# Patient Record
Sex: MALE | Race: WHITE | NOT HISPANIC OR LATINO | URBAN - METROPOLITAN AREA
[De-identification: names, ages, dates, MRNs, and addresses within clinical notes are randomized per-mention and may not be internally consistent; named-entity substitution may affect disease eponyms.]

---

## 2021-09-07 PROBLEM — Z96.1: Noted: 2021-09-07

## 2021-09-07 PROBLEM — Z96.1: Noted: 2021-09-21

## 2021-10-05 ENCOUNTER — POST-OP CATARACT (OUTPATIENT)
Dept: URBAN - METROPOLITAN AREA CLINIC 9 | Facility: CLINIC | Age: 71
End: 2021-10-05

## 2021-10-05 DIAGNOSIS — Z96.1: ICD-10-CM

## 2021-10-05 PROCEDURE — 99024 POSTOP FOLLOW-UP VISIT: CPT

## 2021-10-05 RX ORDER — CARBOXYMETHYLCELLULOSE SODIUM AND GLYCERIN 5; 9 MG/ML; MG/ML: 1 SOLUTION/ DROPS OPHTHALMIC AS NEEDED

## 2021-10-05 RX ORDER — SODIUM CHLORIDE 50 MG/ML: 1 SOLUTION OPHTHALMIC EVERY EVENING

## 2021-10-05 RX ORDER — PREDNISOLONE ACETATE 10 MG/ML: 1 SUSPENSION/ DROPS OPHTHALMIC TWICE A DAY

## 2021-10-05 RX ORDER — BROMFENAC SODIUM 0.7 MG/ML: 1 SOLUTION/ DROPS OPHTHALMIC EVERY MORNING

## 2021-10-05 ASSESSMENT — TONOMETRY
OD_IOP_MMHG: 17
OS_IOP_MMHG: 18

## 2021-10-05 ASSESSMENT — KERATOMETRY
OS_K1POWER_DIOPTERS: 44.25
OS_K2POWER_DIOPTERS: 46.00
OS_AXISANGLE_DEGREES: 72
OD_K2POWER_DIOPTERS: 46.50
OD_AXISANGLE2_DEGREES: 49
OS_AXISANGLE2_DEGREES: 162
OD_K1POWER_DIOPTERS: 43.50
OD_AXISANGLE_DEGREES: 139

## 2021-10-05 ASSESSMENT — VISUAL ACUITY
OD_SC: 20/20
OS_SC: 20/20
OU_SC: 20/20-1

## 2023-05-04 LAB
PROSTATE SPECIFIC AG (NG/ML) IN SER/PLAS: 5 NG/ML (ref 0–4)
PROSTATE SPECIFIC AG FREE (NG/ML) IN SER/PLAS: 0.8 NG/ML
PROSTATE SPECIFIC AG FREE/PROSTATE SPECIFIC AG TOTAL IN SER/PLAS: 16 %

## 2023-05-25 DIAGNOSIS — G40.909 SEIZURE DISORDER (MULTI): ICD-10-CM

## 2023-05-25 PROBLEM — E55.9 VITAMIN D DEFICIENCY: Status: ACTIVE | Noted: 2023-05-25

## 2023-05-25 PROBLEM — K21.9 GERD (GASTROESOPHAGEAL REFLUX DISEASE): Status: ACTIVE | Noted: 2023-05-25

## 2023-05-25 PROBLEM — I48.91 ATRIAL FIBRILLATION (MULTI): Status: ACTIVE | Noted: 2023-05-25

## 2023-05-25 PROBLEM — M54.16 LUMBAR RADICULITIS: Status: ACTIVE | Noted: 2023-05-25

## 2023-05-25 PROBLEM — E78.00 ELEVATED LDL CHOLESTEROL LEVEL: Status: ACTIVE | Noted: 2023-05-25

## 2023-05-25 PROBLEM — M53.9 MULTILEVEL DEGENERATIVE DISC DISEASE: Status: ACTIVE | Noted: 2023-05-25

## 2023-05-25 PROBLEM — L98.9 SKIN LESION: Status: ACTIVE | Noted: 2023-05-25

## 2023-05-25 PROBLEM — R97.20 ELEVATED PSA: Status: ACTIVE | Noted: 2023-05-25

## 2023-05-25 PROBLEM — S01.21XA: Status: ACTIVE | Noted: 2023-05-25

## 2023-05-25 PROBLEM — R73.9 HYPERGLYCEMIA: Status: ACTIVE | Noted: 2023-05-25

## 2023-05-25 PROBLEM — R20.0 NUMBNESS IN RIGHT LEG: Status: ACTIVE | Noted: 2023-05-25

## 2023-05-25 PROBLEM — G25.0 ESSENTIAL TREMOR: Status: ACTIVE | Noted: 2023-05-25

## 2023-05-25 PROBLEM — R06.89 HEAVY BREATHING: Status: ACTIVE | Noted: 2023-05-25

## 2023-05-25 PROBLEM — M19.90 OSTEOARTHRITIS: Status: ACTIVE | Noted: 2023-05-25

## 2023-05-25 PROBLEM — R05.9 COUGH: Status: ACTIVE | Noted: 2023-05-25

## 2023-05-25 RX ORDER — PRIMIDONE 50 MG/1
50 TABLET ORAL
COMMUNITY
Start: 2022-01-19 | End: 2023-05-25 | Stop reason: SDUPTHER

## 2023-05-25 RX ORDER — CEFDINIR 300 MG/1
300 CAPSULE ORAL EVERY 12 HOURS
COMMUNITY
Start: 2023-03-09 | End: 2023-10-12 | Stop reason: ALTCHOICE

## 2023-05-25 RX ORDER — PRIMIDONE 50 MG/1
50 TABLET ORAL 2 TIMES DAILY
Qty: 180 TABLET | Refills: 0 | Status: SHIPPED | OUTPATIENT
Start: 2023-05-25 | End: 2023-09-26 | Stop reason: SDUPTHER

## 2023-05-25 RX ORDER — ATOVAQUONE AND PROGUANIL HYDROCHLORIDE 250; 100 MG/1; MG/1
TABLET, FILM COATED ORAL
COMMUNITY
Start: 2022-07-27 | End: 2023-10-12 | Stop reason: ALTCHOICE

## 2023-05-25 RX ORDER — OMEPRAZOLE 40 MG/1
40 CAPSULE, DELAYED RELEASE ORAL DAILY
COMMUNITY
Start: 2023-02-09 | End: 2023-11-15

## 2023-05-25 RX ORDER — NAPROXEN 500 MG/1
1 TABLET ORAL EVERY 12 HOURS PRN
COMMUNITY
Start: 2022-10-21 | End: 2023-10-12 | Stop reason: ALTCHOICE

## 2023-05-25 RX ORDER — LOPERAMIDE HYDROCHLORIDE 2 MG/1
CAPSULE ORAL
COMMUNITY
Start: 2022-07-27 | End: 2023-10-12 | Stop reason: ALTCHOICE

## 2023-09-10 PROBLEM — R49.0 DYSPHONIA: Status: ACTIVE | Noted: 2023-02-09

## 2023-09-10 PROBLEM — J37.0 CHRONIC LARYNGITIS: Status: ACTIVE | Noted: 2022-03-12

## 2023-09-10 PROBLEM — J30.2 SEASONAL ALLERGIC RHINITIS: Status: ACTIVE | Noted: 2022-03-12

## 2023-09-10 PROBLEM — J32.0 CHRONIC MAXILLARY SINUSITIS: Status: ACTIVE | Noted: 2022-03-12

## 2023-09-10 RX ORDER — PANTOPRAZOLE SODIUM 40 MG/1
TABLET, DELAYED RELEASE ORAL
COMMUNITY
End: 2023-10-12 | Stop reason: ALTCHOICE

## 2023-09-10 RX ORDER — MOXIFLOXACIN 5 MG/ML
SOLUTION/ DROPS OPHTHALMIC
COMMUNITY
End: 2023-10-12 | Stop reason: ALTCHOICE

## 2023-09-10 RX ORDER — SUCRALFATE 1 G/1
TABLET ORAL
COMMUNITY
End: 2024-05-02 | Stop reason: WASHOUT

## 2023-09-10 RX ORDER — HYDROCODONE BITARTRATE AND ACETAMINOPHEN 5; 325 MG/1; MG/1
1 TABLET ORAL EVERY 6 HOURS PRN
COMMUNITY
End: 2023-10-12 | Stop reason: ALTCHOICE

## 2023-09-10 RX ORDER — FLECAINIDE ACETATE 50 MG/1
TABLET ORAL
COMMUNITY
End: 2023-10-12 | Stop reason: ALTCHOICE

## 2023-09-10 RX ORDER — AZELASTINE 1 MG/ML
2 SPRAY, METERED NASAL 2 TIMES DAILY
COMMUNITY
End: 2024-05-02 | Stop reason: WASHOUT

## 2023-09-10 RX ORDER — CHLORHEXIDINE GLUCONATE ORAL RINSE 1.2 MG/ML
SOLUTION DENTAL
COMMUNITY
End: 2024-05-02 | Stop reason: WASHOUT

## 2023-09-10 RX ORDER — FLUTICASONE PROPIONATE 50 MCG
2 SPRAY, SUSPENSION (ML) NASAL DAILY
COMMUNITY
End: 2024-05-02 | Stop reason: WASHOUT

## 2023-09-10 RX ORDER — PREDNISOLONE ACETATE 10 MG/ML
SUSPENSION/ DROPS OPHTHALMIC
COMMUNITY
End: 2024-05-02 | Stop reason: WASHOUT

## 2023-09-26 DIAGNOSIS — G40.909 SEIZURE DISORDER (MULTI): ICD-10-CM

## 2023-09-26 RX ORDER — PRIMIDONE 50 MG/1
50 TABLET ORAL 2 TIMES DAILY
Qty: 60 TABLET | Refills: 0 | Status: SHIPPED | OUTPATIENT
Start: 2023-09-26 | End: 2023-10-12 | Stop reason: SDUPTHER

## 2023-10-12 ENCOUNTER — OFFICE VISIT (OUTPATIENT)
Dept: PRIMARY CARE | Facility: CLINIC | Age: 73
End: 2023-10-12
Payer: MEDICARE

## 2023-10-12 VITALS
HEIGHT: 70 IN | WEIGHT: 189.4 LBS | HEART RATE: 66 BPM | BODY MASS INDEX: 27.11 KG/M2 | SYSTOLIC BLOOD PRESSURE: 118 MMHG | DIASTOLIC BLOOD PRESSURE: 70 MMHG

## 2023-10-12 DIAGNOSIS — J06.9 ACUTE URI: ICD-10-CM

## 2023-10-12 DIAGNOSIS — I48.91 ATRIAL FIBRILLATION, UNSPECIFIED TYPE (MULTI): ICD-10-CM

## 2023-10-12 DIAGNOSIS — R73.9 HYPERGLYCEMIA: ICD-10-CM

## 2023-10-12 DIAGNOSIS — R97.20 ELEVATED PSA: ICD-10-CM

## 2023-10-12 DIAGNOSIS — J37.0 CHRONIC LARYNGITIS: ICD-10-CM

## 2023-10-12 DIAGNOSIS — E78.00 ELEVATED LDL CHOLESTEROL LEVEL: ICD-10-CM

## 2023-10-12 DIAGNOSIS — G25.0 ESSENTIAL TREMOR: ICD-10-CM

## 2023-10-12 DIAGNOSIS — R09.82 POST-NASAL DRIP: ICD-10-CM

## 2023-10-12 DIAGNOSIS — Z00.00 MEDICARE ANNUAL WELLNESS VISIT, SUBSEQUENT: Primary | ICD-10-CM

## 2023-10-12 DIAGNOSIS — E55.9 VITAMIN D DEFICIENCY: ICD-10-CM

## 2023-10-12 DIAGNOSIS — G40.909 SEIZURE DISORDER (MULTI): ICD-10-CM

## 2023-10-12 PROCEDURE — 1159F MED LIST DOCD IN RCRD: CPT | Performed by: STUDENT IN AN ORGANIZED HEALTH CARE EDUCATION/TRAINING PROGRAM

## 2023-10-12 PROCEDURE — G0439 PPPS, SUBSEQ VISIT: HCPCS | Performed by: STUDENT IN AN ORGANIZED HEALTH CARE EDUCATION/TRAINING PROGRAM

## 2023-10-12 PROCEDURE — 1170F FXNL STATUS ASSESSED: CPT | Performed by: STUDENT IN AN ORGANIZED HEALTH CARE EDUCATION/TRAINING PROGRAM

## 2023-10-12 PROCEDURE — 1125F AMNT PAIN NOTED PAIN PRSNT: CPT | Performed by: STUDENT IN AN ORGANIZED HEALTH CARE EDUCATION/TRAINING PROGRAM

## 2023-10-12 PROCEDURE — 99214 OFFICE O/P EST MOD 30 MIN: CPT | Performed by: STUDENT IN AN ORGANIZED HEALTH CARE EDUCATION/TRAINING PROGRAM

## 2023-10-12 RX ORDER — METHYLPREDNISOLONE 4 MG/1
TABLET ORAL
Qty: 21 TABLET | Refills: 0 | Status: SHIPPED | OUTPATIENT
Start: 2023-10-12 | End: 2024-05-02 | Stop reason: WASHOUT

## 2023-10-12 RX ORDER — CETIRIZINE HYDROCHLORIDE 10 MG/1
10 TABLET ORAL DAILY
Qty: 30 TABLET | Refills: 2 | Status: CANCELLED | OUTPATIENT
Start: 2023-10-12 | End: 2024-01-10

## 2023-10-12 RX ORDER — PRIMIDONE 50 MG/1
50 TABLET ORAL 2 TIMES DAILY
Qty: 60 TABLET | Refills: 0 | Status: SHIPPED | OUTPATIENT
Start: 2023-10-12 | End: 2023-10-18 | Stop reason: SDUPTHER

## 2023-10-12 RX ORDER — BENZONATATE 200 MG/1
200 CAPSULE ORAL 3 TIMES DAILY PRN
Qty: 42 CAPSULE | Refills: 0 | Status: SHIPPED | OUTPATIENT
Start: 2023-10-12 | End: 2023-11-11

## 2023-10-12 ASSESSMENT — ACTIVITIES OF DAILY LIVING (ADL)
DOING_HOUSEWORK: INDEPENDENT
DRESSING: INDEPENDENT
GROCERY_SHOPPING: INDEPENDENT
MANAGING_FINANCES: INDEPENDENT
TAKING_MEDICATION: INDEPENDENT
BATHING: INDEPENDENT

## 2023-10-12 ASSESSMENT — ENCOUNTER SYMPTOMS
LOSS OF SENSATION IN FEET: 0
DEPRESSION: 0
OCCASIONAL FEELINGS OF UNSTEADINESS: 0

## 2023-10-12 ASSESSMENT — PATIENT HEALTH QUESTIONNAIRE - PHQ9
SUM OF ALL RESPONSES TO PHQ9 QUESTIONS 1 AND 2: 0
1. LITTLE INTEREST OR PLEASURE IN DOING THINGS: NOT AT ALL
2. FEELING DOWN, DEPRESSED OR HOPELESS: NOT AT ALL

## 2023-10-12 NOTE — PROGRESS NOTES
Subjective   Reason for Visit: Tai Recinos is an 73 y.o. male here for a Medicare Wellness visit.          Reviewed all medications by prescribing practitioner or clinical pharmacist (such as prescriptions, OTCs, herbal therapies and supplements) and documented in the medical record.    HPI    Medicare wellness (subsequent)  Overall feeling well. Has had a cold for the last 3 days.    Immunizations: He has had his Pfizer COVID-19 vaccines with booster, Flu Vaccine, Shingrix, and possible Pneumonia  Due for influenza but will comeback next week when he gets his labwork since he's got a cold today.   Colonoscopy: done in 2023; 5 yr clearance. DUE 2027  Tobacco: Former Smoker, quit Cigarettes >30 years ago. Occasional Cigar.  Alcohol: Occasional    2. URI  Cough, runny nose, congestion x 3 days.   Took a covid test yesterday and it was negative.    3.  Essential Tremor  He takes primidone 50 mg daily for his tremors in his right arm.   The tremors are worse in this 1st and 3rd digit when flexed, or with fine motor movements such as writing.   The primidone has been helping significantly, and does not believe it needs to be increases at this time.   He did follow with Neurology in [place].  He is requesting refills of his primidone today     4. Hx of Afib.  Status post cardio ablation in 2013 for A. Flutter/A Fib.   He did follow with a cardiologist in [place].   He was recommended by his cardiologist to establish with a Cardiologist at [facility].  He denies chest pain, shortness of breath, headaches, or leg edema.  Willing to discuss the  provider as well     5. Post nasal drip/ chronic throat clearing  Currently seeing Dr. Murrell for chronic throat tickle.  Requesting referral for a different ENT.   Currently taking Flonase with Astelin and omeprazole which has not been helping.      6. Vit D def  Documented history of low vit D  Requesting labwork today.     7. Hyperlipidemia  No longer taking  "rivaroxaban 20 mg  Requesting labwork today    8. Elevated PSA  Elevated PSA of 5.0 on 5/1/2023 up from 4.5 on 10/17/2022    9. Former smoker (V15.82) (Z87.891)/ Cigar smoker (305.1) (F17.290)  If AAA screening not performed or found in your records when they arrive, we will provided a requisition at your next visit to have this performed.    10. Hyperglycemia   Requesting lab work today.    No Known Allergies    Immunization History   Administered Date(s) Administered    Hepatitis A vaccine, age 19 years and greater (HAVRIX) 07/27/2022, 02/10/2023    Influenza, High Dose Seasonal, Preservative Free 08/22/2022    Moderna SARS-CoV-2 Vaccination 07/23/2022    Pfizer Purple Cap SARS-CoV-2 01/29/2021, 02/19/2021    Pneumococcal polysaccharide vaccine, 23-valent, age 2 years and older (PNEUMOVAX 23) 12/17/2020    Tdap vaccine, age 7 year and older (BOOSTRIX) 06/08/2022    Typhoid, ViCPs 07/27/2022       Patient Self Assessment of Health Status  Patient Self Assessment: Excellent    Nutrition and Exercise  Current Diet: Well Balanced Diet  Adequate Fluid Intake: No  Caffeine: Yes  Exercise Frequency: Regularly    Functional Ability/Level of Safety  Cognitive Impairment Observed: No cognitive impairment observed    Home Safety Risk Factors: None    Patient Care Team:  Ana Lilia Austin DO as PCP - General     Review of Systems  All pertinent positive symptoms are included in the history of present illness.    All other systems have been reviewed and are negative and noncontributory to this patient's current ailments.    Objective   Vitals:  /70 (BP Location: Right arm, Patient Position: Sitting, BP Cuff Size: Adult)   Pulse 66   Ht 1.778 m (5' 10\")   Wt 85.9 kg (189 lb 6.4 oz)   BMI 27.18 kg/m²     Orders Only on 05/01/2023   Component Date Value Ref Range Status    PSA 05/01/2023 5.0 (H)  0.0 - 4.0 ng/mL Final    Comment: INTERPRETIVE INFORMATION: Prostate Specific Antigen  The Roche PSA electrochemiluminescent " immunoassay is used. Results   obtained with different test methods or kits cannot be used   interchangeably. The Roche PSA method is approved for use as an   aid in the detection of prostate cancer when used in conjunction   with a digital rectal exam in individuals with a prostate age 50   years and older. The Roche PSA is also indicated for the serial   measurement of PSA to aid in the prognosis and management of   prostate cancer patients. Elevated PSA concentrations can only   suggest the presence of prostate cancer until biopsy is performed.   PSA concentrations can also be elevated in benign prostatic   hyperplasia or inflammatory conditions of the prostate. PSA is   generally not elevated in healthy individuals or individuals with   nonprostatic carcinoma.      PSA, Free 05/01/2023 0.8  ng/mL Final    PSA, Free Pct 05/01/2023 16  % Final    Comment: INTERPRETIVE INFORMATION: Prostate Specific Antigen, Free                            Percentage  [facility] uses the Roche Free PSA electrochemiluminescent immunoassay   method in conjunction with the Roche PSA electrochemiluminescent   immunoassay method to determine the free PSA percentage. Values   obtained with different assay methods should not be used   interchangeably. The free PSA percentage is an aid in   distinguishing prostate cancer from benign prostatic conditions in   individuals with a prostate age 50 years and older with a total   PSA between 3 and 10 ng/mL and negative digital rectal examination   findings. Prostatic biopsy is required for the diagnosis of   cancer.   In patients with total PSA concentrations of 4-10 ng/mL, the   probability of finding prostate cancer on needle biopsy by age in   years is:  %fPSA               50-59    60-69    70 or older  0  - 10%            49%      58%      65%  11 - 18%            27%      34%      41%  19 - 25%            18%      24%                                 30%  Greater than 25%     9%      12%       16%  Other factors may help determine the actual risk of prostate   cancer in individual patients.  Performed By: [facility]  [address] [place] [address]  : Chandrakant Sykes MD, PhD         Physical Exam  CONSTITUTIONAL - well nourished, well developed, looks like stated age, in no acute distress, not ill-appearing, and not tired appearing  SKIN - normal skin color and pigmentation, normal skin turgor without rash, lesions, or nodules visualized  HEAD - no trauma, normocephalic  EYES - pupils are equal and reactive to light, extraocular muscles are intact, and normal external exam  ENT - TM's intact, no injection, no signs of infection, uvula midline, normal tongue movement and throat normal, no exudate, nasal passage without discharge and patent  NECK - supple without rigidity, no neck mass was observed, no thyromegaly or thyroid nodules  CHEST - clear to auscultation, no wheezing, no crackles and no rales, good effort  CARDIAC - regular rate and regular rhythm, no skipped beats, no murmur  ABDOMEN - no organomegaly, soft, nontender, nondistended, normal bowel sounds, no guarding/rebound/rigidity  EXTREMITIES - no edema, no deformities  NEUROLOGICAL - normal gait, normal balance, normal motor, no ataxia  PSYCHIATRIC - alert, pleasant and cordial, age-appropriate  IMMUNOLOGIC - no cervical lymphadenopathy    Assessment/Plan   Problem List Items Addressed This Visit       Atrial fibrillation (CMS/HCC)    Relevant Orders    TSH with reflex to Free T4 if abnormal    Lipid Panel    Comprehensive Metabolic Panel    CBC and Auto Differential    Hemoglobin A1C    Vitamin D 25-Hydroxy,Total (for eval of Vitamin D levels)    Elevated LDL cholesterol level    Relevant Orders    TSH with reflex to Free T4 if abnormal    Lipid Panel    Comprehensive Metabolic Panel    CBC and Auto Differential    Hemoglobin A1C    Vitamin D 25-Hydroxy,Total (for eval of Vitamin D levels)    Elevated  PSA    Relevant Orders    Prostate Specific Antigen    TSH with reflex to Free T4 if abnormal    Lipid Panel    Comprehensive Metabolic Panel    CBC and Auto Differential    Hemoglobin A1C    Vitamin D 25-Hydroxy,Total (for eval of Vitamin D levels)    Essential tremor    Relevant Medications    primidone (Mysoline) 50 mg tablet    Other Relevant Orders    TSH with reflex to Free T4 if abnormal    Lipid Panel    Comprehensive Metabolic Panel    CBC and Auto Differential    Hemoglobin A1C    Vitamin D 25-Hydroxy,Total (for eval of Vitamin D levels)    Hyperglycemia    Relevant Orders    TSH with reflex to Free T4 if abnormal    Lipid Panel    Comprehensive Metabolic Panel    CBC and Auto Differential    Hemoglobin A1C    Vitamin D 25-Hydroxy,Total (for eval of Vitamin D levels)    Vitamin D deficiency    Relevant Orders    TSH with reflex to Free T4 if abnormal    Lipid Panel    Comprehensive Metabolic Panel    CBC and Auto Differential    Hemoglobin A1C    Vitamin D 25-Hydroxy,Total (for eval of Vitamin D levels)    Chronic laryngitis    Relevant Orders    TSH with reflex to Free T4 if abnormal    Lipid Panel    Comprehensive Metabolic Panel    CBC and Auto Differential    Hemoglobin A1C    Vitamin D 25-Hydroxy,Total (for eval of Vitamin D levels)    Medicare annual wellness visit, subsequent - Primary    Relevant Orders    TSH with reflex to Free T4 if abnormal    Lipid Panel    Comprehensive Metabolic Panel    CBC and Auto Differential    Hemoglobin A1C    Vitamin D 25-Hydroxy,Total (for eval of Vitamin D levels)     Other Visit Diagnoses       Seizure disorder (CMS/HCC)        Relevant Orders    TSH with reflex to Free T4 if abnormal    Lipid Panel    Comprehensive Metabolic Panel    CBC and Auto Differential    Hemoglobin A1C    Vitamin D 25-Hydroxy,Total (for eval of Vitamin D levels)    Acute URI        Relevant Medications    methylPREDNISolone (Medrol Dospak) 4 mg tablets    benzonatate (Tessalon) 200 mg  capsule          1. Encounter for Medicare annual wellness exam (V70.0) (Z00.00)  Complete history and physical examination as well as an MCR were performed  We will notify of test results once available and make treatment recommendations accordingly     2. Acute URI  A medrol dose pack and tessalon pearls were sent to your pharmacy     3. Essential tremor (333.1) (G25.0)  We can provide refills for your Primidone. If you ever desire to have a referral to a neurologist, we will be happy to provide one. Refill provided without any changes    4. Atrial fibrillation (427.31) (I48.91)  Asymptomatic, Please call and schedule an appointment with your Cardiologist. If you need a referral we will be glad to provide one.  I will have my MA provide information for Dr. Gaviria    5. Post nasal drip/chronic throat clearing (786.09) (R06.89)  A rx for zyrtec was sent to your pharmacy to see if this helps your sxs.  Additionally, we will send a referral to a new ENT as per your request.     6. Vitamin D deficiency (268.9) (E55.9)  We will notify of test results once available and make treatment recommendations accordingly    7. Hyperlipidemia  We will notify of test results once available and make treatment recommendations accordingly    8. Prostate cancer screening (V76.44) (Z12.5)  We will notify of test results once available and make treatment recommendations accordingly     9. Former smoker (V15.82) (Z87.891)/ Cigar smoker (305.1) (F17.290)  If AAA screening not performed or found in your records when they arrive, we will provided a requisition to have this performed.    10. Hyperglycemia   We will notify of test results once available and make treatment recommendations accordingly    Thank you for letting us be a part of your care team.  Please call the office if you have further questions or concerns regarding your care  Please follow up in 1 year for your annual wellness exam.

## 2023-10-18 ENCOUNTER — LAB (OUTPATIENT)
Dept: LAB | Facility: LAB | Age: 73
End: 2023-10-18
Payer: MEDICARE

## 2023-10-18 DIAGNOSIS — G25.0 ESSENTIAL TREMOR: ICD-10-CM

## 2023-10-18 DIAGNOSIS — R73.9 HYPERGLYCEMIA: ICD-10-CM

## 2023-10-18 DIAGNOSIS — E78.00 ELEVATED LDL CHOLESTEROL LEVEL: ICD-10-CM

## 2023-10-18 DIAGNOSIS — E55.9 VITAMIN D DEFICIENCY: ICD-10-CM

## 2023-10-18 DIAGNOSIS — R97.20 ELEVATED PSA: ICD-10-CM

## 2023-10-18 DIAGNOSIS — J37.0 CHRONIC LARYNGITIS: ICD-10-CM

## 2023-10-18 DIAGNOSIS — G40.909 SEIZURE DISORDER (MULTI): ICD-10-CM

## 2023-10-18 DIAGNOSIS — Z00.00 MEDICARE ANNUAL WELLNESS VISIT, SUBSEQUENT: ICD-10-CM

## 2023-10-18 DIAGNOSIS — I48.91 ATRIAL FIBRILLATION, UNSPECIFIED TYPE (MULTI): ICD-10-CM

## 2023-10-18 LAB
25(OH)D3 SERPL-MCNC: 71 NG/ML (ref 30–100)
ALBUMIN SERPL BCP-MCNC: 4.4 G/DL (ref 3.4–5)
ALP SERPL-CCNC: 87 U/L (ref 33–136)
ALT SERPL W P-5'-P-CCNC: 14 U/L (ref 10–52)
ANION GAP SERPL CALC-SCNC: 10 MMOL/L (ref 10–20)
AST SERPL W P-5'-P-CCNC: 17 U/L (ref 9–39)
BASOPHILS # BLD AUTO: 0.05 X10*3/UL (ref 0–0.1)
BASOPHILS NFR BLD AUTO: 0.7 %
BILIRUB SERPL-MCNC: 1 MG/DL (ref 0–1.2)
BUN SERPL-MCNC: 26 MG/DL (ref 6–23)
CALCIUM SERPL-MCNC: 9.5 MG/DL (ref 8.6–10.3)
CHLORIDE SERPL-SCNC: 102 MMOL/L (ref 98–107)
CHOLEST SERPL-MCNC: 191 MG/DL (ref 0–199)
CHOLESTEROL/HDL RATIO: 3.8
CO2 SERPL-SCNC: 29 MMOL/L (ref 21–32)
CREAT SERPL-MCNC: 0.95 MG/DL (ref 0.5–1.3)
EOSINOPHIL # BLD AUTO: 0.26 X10*3/UL (ref 0–0.4)
EOSINOPHIL NFR BLD AUTO: 3.8 %
ERYTHROCYTE [DISTWIDTH] IN BLOOD BY AUTOMATED COUNT: 12.8 % (ref 11.5–14.5)
EST. AVERAGE GLUCOSE BLD GHB EST-MCNC: 114 MG/DL
GFR SERPL CREATININE-BSD FRML MDRD: 86 ML/MIN/1.73M*2
GLUCOSE SERPL-MCNC: 90 MG/DL (ref 74–99)
HBA1C MFR BLD: 5.6 %
HCT VFR BLD AUTO: 47.2 % (ref 41–52)
HDLC SERPL-MCNC: 50.2 MG/DL
HGB BLD-MCNC: 16.1 G/DL (ref 13.5–17.5)
IMM GRANULOCYTES # BLD AUTO: 0.02 X10*3/UL (ref 0–0.5)
IMM GRANULOCYTES NFR BLD AUTO: 0.3 % (ref 0–0.9)
LDLC SERPL CALC-MCNC: 128 MG/DL
LYMPHOCYTES # BLD AUTO: 2.21 X10*3/UL (ref 0.8–3)
LYMPHOCYTES NFR BLD AUTO: 32.1 %
MCH RBC QN AUTO: 32.7 PG (ref 26–34)
MCHC RBC AUTO-ENTMCNC: 34.1 G/DL (ref 32–36)
MCV RBC AUTO: 96 FL (ref 80–100)
MONOCYTES # BLD AUTO: 0.65 X10*3/UL (ref 0.05–0.8)
MONOCYTES NFR BLD AUTO: 9.4 %
NEUTROPHILS # BLD AUTO: 3.69 X10*3/UL (ref 1.6–5.5)
NEUTROPHILS NFR BLD AUTO: 53.7 %
NON HDL CHOLESTEROL: 141 MG/DL (ref 0–149)
NRBC BLD-RTO: 0 /100 WBCS (ref 0–0)
PLATELET # BLD AUTO: 195 X10*3/UL (ref 150–450)
PMV BLD AUTO: 9.5 FL (ref 7.5–11.5)
POTASSIUM SERPL-SCNC: 4.3 MMOL/L (ref 3.5–5.3)
PROT SERPL-MCNC: 7.1 G/DL (ref 6.4–8.2)
PSA SERPL-MCNC: 4.82 NG/ML
RBC # BLD AUTO: 4.93 X10*6/UL (ref 4.5–5.9)
SODIUM SERPL-SCNC: 137 MMOL/L (ref 136–145)
TRIGL SERPL-MCNC: 65 MG/DL (ref 0–149)
TSH SERPL-ACNC: 1.65 MIU/L (ref 0.44–3.98)
VLDL: 13 MG/DL (ref 0–40)
WBC # BLD AUTO: 6.9 X10*3/UL (ref 4.4–11.3)

## 2023-10-18 PROCEDURE — 82306 VITAMIN D 25 HYDROXY: CPT

## 2023-10-18 PROCEDURE — 83036 HEMOGLOBIN GLYCOSYLATED A1C: CPT

## 2023-10-18 PROCEDURE — 36415 COLL VENOUS BLD VENIPUNCTURE: CPT

## 2023-10-18 PROCEDURE — 85025 COMPLETE CBC W/AUTO DIFF WBC: CPT

## 2023-10-18 PROCEDURE — 80053 COMPREHEN METABOLIC PANEL: CPT

## 2023-10-18 PROCEDURE — 84443 ASSAY THYROID STIM HORMONE: CPT

## 2023-10-18 PROCEDURE — 80061 LIPID PANEL: CPT

## 2023-10-18 PROCEDURE — 84153 ASSAY OF PSA TOTAL: CPT

## 2023-10-18 RX ORDER — PRIMIDONE 50 MG/1
50 TABLET ORAL 2 TIMES DAILY
Qty: 180 TABLET | Refills: 1 | Status: SHIPPED | OUTPATIENT
Start: 2023-10-18 | End: 2024-05-06

## 2023-10-18 NOTE — RESULT ENCOUNTER NOTE
Cholesterol did increase slightly to 191, HDL 50, , triglycerides 65    Sugar, kidneys, liver, electrolytes are all within normal limits    Prostate continues to be slightly elevated at 4.82  Has the patient ever followed up with a urologist  Rancho is to do so at his earliest mutual convenience    Hemoglobin A1c well within normal limits alongside vitamin D and thyroid    Complete blood cell count shows no anemia

## 2023-11-30 ENCOUNTER — APPOINTMENT (OUTPATIENT)
Dept: OTOLARYNGOLOGY | Facility: CLINIC | Age: 73
End: 2023-11-30
Payer: MEDICARE

## 2023-12-06 ENCOUNTER — OFFICE VISIT (OUTPATIENT)
Dept: OTOLARYNGOLOGY | Facility: CLINIC | Age: 73
End: 2023-12-06
Payer: MEDICARE

## 2023-12-06 VITALS — WEIGHT: 180 LBS | BODY MASS INDEX: 25.83 KG/M2

## 2023-12-06 DIAGNOSIS — J34.2 DEVIATED SEPTUM: Primary | ICD-10-CM

## 2023-12-06 DIAGNOSIS — R05.3 CHRONIC COUGH: ICD-10-CM

## 2023-12-06 DIAGNOSIS — J32.9 CHRONIC SINUSITIS, UNSPECIFIED LOCATION: ICD-10-CM

## 2023-12-06 PROCEDURE — 99214 OFFICE O/P EST MOD 30 MIN: CPT | Performed by: GENERAL PRACTICE

## 2023-12-06 PROCEDURE — 31575 DIAGNOSTIC LARYNGOSCOPY: CPT | Performed by: GENERAL PRACTICE

## 2023-12-06 PROCEDURE — 1159F MED LIST DOCD IN RCRD: CPT | Performed by: GENERAL PRACTICE

## 2023-12-06 PROCEDURE — 1125F AMNT PAIN NOTED PAIN PRSNT: CPT | Performed by: GENERAL PRACTICE

## 2023-12-06 PROCEDURE — 4004F PT TOBACCO SCREEN RCVD TLK: CPT | Performed by: GENERAL PRACTICE

## 2023-12-06 RX ORDER — AMOXICILLIN AND CLAVULANATE POTASSIUM 875; 125 MG/1; MG/1
1 TABLET, FILM COATED ORAL 2 TIMES DAILY
Qty: 28 TABLET | Refills: 0 | Status: SHIPPED | OUTPATIENT
Start: 2023-12-06 | End: 2023-12-20

## 2023-12-06 RX ORDER — MUPIROCIN 20 MG/G
OINTMENT TOPICAL
Qty: 22 G | Refills: 0 | Status: SHIPPED | OUTPATIENT
Start: 2023-12-06 | End: 2024-05-02 | Stop reason: WASHOUT

## 2023-12-06 RX ORDER — MUPIROCIN 20 MG/G
OINTMENT TOPICAL
Qty: 22 G | Refills: 0 | Status: SHIPPED | OUTPATIENT
Start: 2023-12-06 | End: 2023-12-06

## 2023-12-06 ASSESSMENT — PATIENT HEALTH QUESTIONNAIRE - PHQ9
2. FEELING DOWN, DEPRESSED OR HOPELESS: NOT AT ALL
SUM OF ALL RESPONSES TO PHQ9 QUESTIONS 1 AND 2: 0
1. LITTLE INTEREST OR PLEASURE IN DOING THINGS: NOT AT ALL

## 2023-12-06 NOTE — PROGRESS NOTES
Otolaryngology - Head and Neck Surgery Outpatient New Patient Visit Note        Assessment/Plan   Problem List Items Addressed This Visit             ICD-10-CM       Pulmonary and Pneumonias    Cough R05.9     Other Visit Diagnoses         Codes    Deviated septum    -  Primary J34.2    Chronic sinusitis, unspecified location     J32.9    Relevant Medications    amoxicillin-pot clavulanate (Augmentin) 875-125 mg tablet    mupirocin (Bactroban) 2 % ointment    budesonide (Pulmicort) 0.5 mg/2 mL oral liquid    Other Relevant Orders    CT sinus wo IV contrast            73yoM with longstanding cough and throat clearning.   No improvement with prior reflux management.    Some rhinitis and discolored PND on NP scope.  Leftward NSD limits eval with nasal endscopy.  Will acquire treated CT sinus.  Will treat with augmentin as well as medicated irrigations (bactroban/budesonide) as there is some mucoid debris high in left nasal cavity.    RTC to discuss symptoms and imaging results.   Discussed consideration of SLP referral for habit cough but pt declines for now.           Follow up:  -plan for follow up in clinic as needed and after completion of ordered studies    All of the above findings, impressions, treatment planning and follow up plans were discussed with the patient who indicated understanding.  the patient was instructed to contact or return to clinic sooner if symptoms/signs persist or worsen despite the above management.      Shin Crowder MD  Otolaryngology - Head and Neck Surgery            History Of Present Illness  Tai Recinos is a 73 y.o. male presenting for chronic cough and throat tickle over years.   No inciting illness/trauma.    Reports prior use of astelin/flonase with limited effect.   Reports 6mo of use of reflux medications without effect.   Notes some congestion and PND.   Notes nonproductive cough  Notes a history of intermittent laryngospasm over >10y, but none in last 2-3y.      Denies  sore throat.   Deneis significant prior recurrent sinusitis  Denies nasal trauma/surgery.                Past Medical History  He has a past medical history of Personal history of other diseases of the nervous system and sense organs and Personal history of other specified conditions (02/23/2022).    Surgical History  He has a past surgical history that includes Other surgical history (01/19/2022); Other surgical history (11/07/2022); and Other surgical history (11/07/2022).     Social History  He reports that he has been smoking cigars. He has never used smokeless tobacco. No history on file for alcohol use and drug use.    Family History  Family History   Problem Relation Name Age of Onset    Hearing loss Other      Cancer Other          Allergies  Patient has no known allergies.    Review of Systems  ROS: Pertinent positives as noted in HPI.    - CONSTITUTIONAL: Does not report weight loss, fever or chills.    - HEENT:   Ear: Does not report tinnitus, vertigo, hearing loss, otalgia, otorrhea  Nose: Does not report  ,  , epistaxis, decreased smell  Throat: Does not report pain, dysphagia, odynophagia  Larynx: Does not report hoarseness,  difficulty breathing, pain with speaking (odynophonia)  Neck: Does not report new masses, pain, swelling  Face: Does not report sinus pain, pressure, swelling, numbness, weakness     - RESPIRATORY: Does not report SOB or  .    - CV: Does not report palpitations or chest pain.     - GI: Does not report abdominal pain, nausea, vomiting or diarrhea.    - : Does not report dysuria or urinary frequency.    - MSK: Does not report myalgia or joint pain.    - SKIN: Does not report rash or pruritus.    - NEUROLOGICAL: Does not report headache or syncope.    - PSYCHIATRIC: Does not report recent changes in mood. Does not report anxiety or depression.         Physical Exam:     GENERAL:   Alert & Oriented to person, place and time; Normal affect and appearance. Well developed and well  nourished. Conversant & cooperative with examination.     HEAD:   Normocephalic, atraumatic. No sinus tenderness to palpation. Normal parotid bilaterally. Normal facial strength.     NEUROLOGIC:   Cranial nerves II-XII grossly intact, gait WNL. Normal mood and affect.    EYES:   Extraocular movements intact. Pupils equal, round, reactive to light and accommodation. No nystagmus, no ptosis. no scleral injection.    EAR:   Normal auricle. No discomfort or TTP with manipulation.   Handheld otoscopic exam showed normal external auditory canals bilaterally. No purulence or EAC inflammation. Minimal cerumen.   Right tympanic membrane clear and mobile without evidence of perforation, retraction or middle ear effusion.   Left tympanic membrane clear and mobile without evidence of perforation, retraction or middle ear effusion.     NOSE:   No external deformity. No external nasal lesions, lacerations, or scars. Nasal tip symmetrical with normal nasal valves.   Nasal cavity with leftward deviated  septum, edematous  mucosa and turbinates. Pale mucoid debris in left OMU, but exam limited by deviated septum.  Pale mucoid debris eminating from posterior nasal cavity on the left.  No lesions, masses,   or polyps.     OC/OP:   Mucous membranes moist, no masses, lesions or exudates.   Normal tongue, floor of mouth, teeth, gums, lips. Normal posterior pharyngeal wall.    Normal tonsils without erythema, exudate or obvious calculi     NECK:   No neck masses or thyroid enlargement. Trachea midline. No tenderness to palpation    LYMPHATIC:   No cervical lymphadenopathy.     RESPIRATORY:   Symmetric chest elevation & no retractions. No significant hoarseness. No increased work of breathing.    CV:   No clubbing or cyanosis. No obvious edema    Skin:   No facial rashes, vesicles or lesions.     Extremities:   No gross abnormalities      Clinic Procedure    Nasal Endoscopy Laryngoscopy Nasophrayngosocopy   Procedure: flexible fiberoptic  laryngoscopy, nasopharyngoscopy.   Flexible Fiberoptic Laryngoscopy Indication:   inability to tolerate mirror exam     Risks, benefits, and alternatives, infection risk, bleeding risk and risk of mucosal trauma and pain were discussed with the patient. Verbal consent was obtained prior to the procedure and is detailed in the patient's record.     Procedure Note:      With the patient seated in the exam chair, the bilateral nasal cavity was topically treated with 4% lidocaine and phenylephrine.  The bilateral nasal cavity was intubated with the flexible laryngoscope.   Nasal Endoscopy: Nasal endoscopy was successfully completed using the endoscope and the nasal mucosa, septum, turbinates, and osteomeatal complex were examined.  Nasopharyngoscopy: Nasopharyngoscopy was successfully completed using the endoscope and the nasal mucosa, septum, turbinates, osteomeatal complex, and nasopharynx were examined.   Laryngoscopy: Laryngoscopy was successfully completed using the flexible laryngoscope and the nasopharynx, hypopharynx, and larynx were examined.  Patient Status: the patient tolerated the procedure well.   Complications: there were no complications.      . After obtaining adequate decongestion and anesthesia, the scope was introduced into the floor of the nasal cavity. The septum, inferior, and middle turbinates were without any mucosal lesions.  The Fossa of [place] were clear bilaterally, and good velopharyngeal closure was obtained on phonation.  Base of tongue, tonsillar complex, and posterior pharyngeal wall free of asymmetry or concerning ulcerations or masses.  supraglottic segments with mild edema,   and erythema at the esophageal opening and posterior supraglottis.    No mucosal ulcerations or masses.  True vocal cords abduct and adduct normally with no mucosal or mass lesions.  No masses visualized in the pyriform recesses.  The scope was removed and patient tolerated the procedure well.       Information review:  External sources (notes, imaging, lab results) listed below personally reviewed to aid in medical decision making process.  -  -  -

## 2023-12-07 DIAGNOSIS — R97.20 ELEVATED PSA: ICD-10-CM

## 2023-12-07 DIAGNOSIS — J32.9 CHRONIC SINUSITIS, UNSPECIFIED LOCATION: ICD-10-CM

## 2023-12-07 RX ORDER — BUDESONIDE 0.5 MG/2ML
INHALANT ORAL
Qty: 60 ML | Refills: 0 | Status: SHIPPED | OUTPATIENT
Start: 2023-12-07 | End: 2023-12-11

## 2023-12-08 DIAGNOSIS — J30.2 SEASONAL ALLERGIC RHINITIS, UNSPECIFIED TRIGGER: Primary | ICD-10-CM

## 2023-12-08 RX ORDER — MOMETASONE FUROATE 50 UG/1
2 SPRAY, METERED NASAL DAILY
Qty: 17 G | Refills: 11 | Status: SHIPPED | OUTPATIENT
Start: 2023-12-08 | End: 2024-05-02 | Stop reason: WASHOUT

## 2023-12-09 DIAGNOSIS — J32.9 CHRONIC SINUSITIS, UNSPECIFIED LOCATION: ICD-10-CM

## 2023-12-11 RX ORDER — BUDESONIDE 0.5 MG/2ML
INHALANT ORAL
Qty: 360 ML | Refills: 1 | Status: SHIPPED | OUTPATIENT
Start: 2023-12-11 | End: 2024-05-02 | Stop reason: WASHOUT

## 2023-12-27 ENCOUNTER — ANCILLARY PROCEDURE (OUTPATIENT)
Dept: RADIOLOGY | Facility: CLINIC | Age: 73
End: 2023-12-27
Payer: MEDICARE

## 2023-12-27 DIAGNOSIS — J32.9 CHRONIC SINUSITIS, UNSPECIFIED LOCATION: ICD-10-CM

## 2023-12-27 PROCEDURE — 70486 CT MAXILLOFACIAL W/O DYE: CPT | Performed by: RADIOLOGY

## 2023-12-27 PROCEDURE — 70486 CT MAXILLOFACIAL W/O DYE: CPT

## 2024-01-15 ENCOUNTER — APPOINTMENT (OUTPATIENT)
Dept: OTOLARYNGOLOGY | Facility: CLINIC | Age: 74
End: 2024-01-15
Payer: MEDICARE

## 2024-01-22 ENCOUNTER — OFFICE VISIT (OUTPATIENT)
Dept: OTOLARYNGOLOGY | Facility: CLINIC | Age: 74
End: 2024-01-22
Payer: MEDICARE

## 2024-01-22 VITALS — BODY MASS INDEX: 25.83 KG/M2 | WEIGHT: 180 LBS

## 2024-01-22 DIAGNOSIS — J32.2 CHRONIC ETHMOIDAL SINUSITIS: Primary | ICD-10-CM

## 2024-01-22 DIAGNOSIS — R05.3 CHRONIC COUGH: ICD-10-CM

## 2024-01-22 PROCEDURE — 99214 OFFICE O/P EST MOD 30 MIN: CPT | Performed by: GENERAL PRACTICE

## 2024-01-22 PROCEDURE — 1125F AMNT PAIN NOTED PAIN PRSNT: CPT | Performed by: GENERAL PRACTICE

## 2024-01-22 PROCEDURE — 1159F MED LIST DOCD IN RCRD: CPT | Performed by: GENERAL PRACTICE

## 2024-01-22 PROCEDURE — 4004F PT TOBACCO SCREEN RCVD TLK: CPT | Performed by: GENERAL PRACTICE

## 2024-01-22 RX ORDER — CLARITHROMYCIN 500 MG/1
1000 TABLET, FILM COATED, EXTENDED RELEASE ORAL DAILY
Qty: 28 TABLET | Refills: 0 | Status: SHIPPED | OUTPATIENT
Start: 2024-01-22 | End: 2024-02-05

## 2024-01-22 ASSESSMENT — PATIENT HEALTH QUESTIONNAIRE - PHQ9
SUM OF ALL RESPONSES TO PHQ9 QUESTIONS 1 AND 2: 0
2. FEELING DOWN, DEPRESSED OR HOPELESS: NOT AT ALL
1. LITTLE INTEREST OR PLEASURE IN DOING THINGS: NOT AT ALL

## 2024-01-22 NOTE — PROGRESS NOTES
Otolaryngology - Head and Neck Surgery Outpatient New Patient Visit Note        Assessment/Plan   Problem List Items Addressed This Visit             ICD-10-CM       ENT    Chronic maxillary sinusitis - Primary J32.0    Relevant Medications    clarithromycin XL (Biaxin XL) 500 mg 24 hr tablet       Pulmonary and Pneumonias    Cough R05.9       Some improvement while on antibiotics but ongoing chronic cough and throat clearing.  CT suggests chronic sinusitis.  Would like to try a different antibiotic, will treat with biaxin.        Follow up:  -plan for follow up in clinic as needed    All of the above findings, impressions, treatment planning and follow up plans were discussed with the patient who indicated understanding.  the patient was instructed to contact or return to clinic sooner if symptoms/signs persist or worsen despite the above management.      Shin Crowder MD  Otolaryngology - Head and Neck Surgery            History Of Present Illness  Tai Recinos is a 73 y.o. male presenting for follow up of treated CT sinus and chronic throat clearing.    Reports some incomplete improvement while on antibiotics, but symptoms have returned to baseline since stopping.  Continues on mometasone.    No reflux symptoms.     Recall    Tai Recinos is a 73 y.o. male presenting for chronic cough and throat tickle over years.   No inciting illness/trauma.     Reports prior use of astelin/flonase with limited effect.   Reports 6mo of use of reflux medications without effect.   Notes some congestion and PND.   Notes nonproductive cough  Notes a history of intermittent laryngospasm over >10y, but none in last 2-3y.       Denies sore throat.   Deneis significant prior recurrent sinusitis  Denies nasal trauma/surgery.            Past Medical History  He has a past medical history of Personal history of other diseases of the nervous system and sense organs and Personal history of other specified conditions  (02/23/2022).    Surgical History  He has a past surgical history that includes Other surgical history (01/19/2022); Other surgical history (11/07/2022); and Other surgical history (11/07/2022).     Social History  He reports that he has been smoking cigars. He has never used smokeless tobacco. No history on file for alcohol use and drug use.    Family History  Family History   Problem Relation Name Age of Onset    Hearing loss Other      Cancer Other          Allergies  Patient has no known allergies.    Review of Systems  ROS: Pertinent positives as noted in HPI.    - CONSTITUTIONAL: Does not report weight loss, fever or chills.    - HEENT:   Ear: Does not report tinnitus, vertigo, hearing loss, otalgia, otorrhea  Nose: Does not report congestion, rhinorrhea, epistaxis, decreased smell  Throat: Does not report pain, dysphagia, odynophagia  Larynx: Does not report hoarseness,  difficulty breathing, pain with speaking (odynophonia)  Neck: Does not report new masses, pain, swelling  Face: Does not report sinus pain, pressure, swelling, numbness, weakness     - RESPIRATORY: Does not report SOB or  .    - CV: Does not report palpitations or chest pain.     - GI: Does not report abdominal pain, nausea, vomiting or diarrhea.    - : Does not report dysuria or urinary frequency.    - MSK: Does not report myalgia or joint pain.    - SKIN: Does not report rash or pruritus.    - NEUROLOGICAL: Does not report headache or syncope.    - PSYCHIATRIC: Does not report recent changes in mood. Does not report anxiety or depression.         Physical Exam:     GENERAL:   Alert & Oriented to person, place and time; Normal affect and appearance. Well developed and well nourished. Conversant & cooperative with examination.     HEAD:   Normocephalic, atraumatic. No sinus tenderness to palpation. Normal parotid bilaterally. Normal facial strength.     NEUROLOGIC:   Cranial nerves II-XII grossly intact, gait WNL. Normal mood and  affect.    EYES:   Extraocular movements intact. Pupils equal, round, reactive to light and accommodation. No nystagmus, no ptosis. no scleral injection.    EAR:   Normal auricle. No discomfort or TTP with manipulation.   Handheld otoscopic exam showed normal external auditory canals bilaterally. No purulence or EAC inflammation. Minimal cerumen.   Right tympanic membrane clear and mobile without evidence of perforation, retraction or middle ear effusion.   Left tympanic membrane clear and mobile without evidence of perforation, retraction or middle ear effusion.     NOSE:   No external deformity. No external nasal lesions, lacerations, or scars. Nasal tip symmetrical with normal nasal valves.   Nasal cavity with leftward septum, normal mucosa and turbinates. No lesions, masses, purulence or polyps.     OC/OP:   Mucous membranes moist, no masses, lesions or exudates.   Normal tongue, floor of mouth, teeth, gums, lips. Normal posterior pharyngeal wall.    Normal tonsils without erythema, exudate or obvious calculi     NECK:   No neck masses or thyroid enlargement. Trachea midline. No tenderness to palpation    LYMPHATIC:   No cervical lymphadenopathy.     RESPIRATORY:   Symmetric chest elevation & no retractions. No significant hoarseness. No increased work of breathing.    CV:   No clubbing or cyanosis. No obvious edema    Skin:   No facial rashes, vesicles or lesions.     Extremities:   No gross abnormalities      Clinic Procedure        Information review:  External sources (notes, imaging, lab results) listed below personally reviewed to aid in medical decision making process.  - CT sinus shows frontal/ethmoid mucosal thickening, some bubbling without fluid level in L>R sphenoid, slight maxillary mucosal thickening and leftward NSD.    -  -

## 2024-02-06 ENCOUNTER — LAB (OUTPATIENT)
Dept: LAB | Facility: LAB | Age: 74
End: 2024-02-06
Payer: MEDICARE

## 2024-02-06 DIAGNOSIS — R97.20 ELEVATED PSA: ICD-10-CM

## 2024-02-06 LAB — PSA SERPL-MCNC: 4.42 NG/ML

## 2024-02-06 PROCEDURE — 84153 ASSAY OF PSA TOTAL: CPT

## 2024-02-06 PROCEDURE — 36415 COLL VENOUS BLD VENIPUNCTURE: CPT

## 2024-02-28 ENCOUNTER — OFFICE VISIT (OUTPATIENT)
Dept: UROLOGY | Facility: CLINIC | Age: 74
End: 2024-02-28
Payer: MEDICARE

## 2024-02-28 DIAGNOSIS — R97.20 ELEVATED PSA: ICD-10-CM

## 2024-02-28 DIAGNOSIS — N40.0 BENIGN PROSTATIC HYPERPLASIA WITHOUT LOWER URINARY TRACT SYMPTOMS: Primary | ICD-10-CM

## 2024-02-28 PROCEDURE — 4004F PT TOBACCO SCREEN RCVD TLK: CPT | Performed by: STUDENT IN AN ORGANIZED HEALTH CARE EDUCATION/TRAINING PROGRAM

## 2024-02-28 PROCEDURE — 99213 OFFICE O/P EST LOW 20 MIN: CPT | Performed by: STUDENT IN AN ORGANIZED HEALTH CARE EDUCATION/TRAINING PROGRAM

## 2024-02-28 PROCEDURE — 1125F AMNT PAIN NOTED PAIN PRSNT: CPT | Performed by: STUDENT IN AN ORGANIZED HEALTH CARE EDUCATION/TRAINING PROGRAM

## 2024-02-28 PROCEDURE — 1159F MED LIST DOCD IN RCRD: CPT | Performed by: STUDENT IN AN ORGANIZED HEALTH CARE EDUCATION/TRAINING PROGRAM

## 2024-02-28 NOTE — PROGRESS NOTES
Subjective   Patient ID: Tai Recinos is a 73 y.o. male.    HPI  74 yo male with history of TURP, and elevated PSA. No evidence of clinically significant prostate cancer, on MRI prostate from over a year ago. Now, PSA 4.4.     He is doing well overall. No complaints.     Lab Results   Component Value Date    PSA 4.42 (H) 02/06/2024    PSA 4.82 (H) 10/18/2023    PSA 5.0 (H) 05/01/2023    PSA 4.5 (H) 10/17/2022       Review of Systems    Objective   Physical Exam  Vitals reviewed.         Assessment/Plan   74 yo male with history of TURP, and elevated PSA. No evidence of clinically significant prostate cancer, on MRI prostate from over a year ago. Now, PSA 4.4.     He is doing well overall. No urinary complaints.     We will continue to monitor the trend every 6 months, he agrees with plan:    Plan:  PSA 6 months, August 2024.   FUV mid August 2024 after PSA obtained.   Diagnoses and all orders for this visit:  Elevated PSA  -     Prostate Specific Antigen; Future      Scribe Attestation  By signing my name below, ITala Scribmelody attest that this documentation has been prepared under the direction and in the presence of Ellen Oconnell MD.

## 2024-03-27 ENCOUNTER — OFFICE VISIT (OUTPATIENT)
Dept: ORTHOPEDIC SURGERY | Facility: HOSPITAL | Age: 74
End: 2024-03-27
Payer: MEDICARE

## 2024-03-27 ENCOUNTER — HOSPITAL ENCOUNTER (OUTPATIENT)
Dept: RADIOLOGY | Facility: HOSPITAL | Age: 74
Discharge: HOME | End: 2024-03-27
Payer: MEDICARE

## 2024-03-27 DIAGNOSIS — M25.561 RIGHT KNEE PAIN, UNSPECIFIED CHRONICITY: ICD-10-CM

## 2024-03-27 DIAGNOSIS — M17.11 OSTEOARTHRITIS OF RIGHT KNEE, UNSPECIFIED OSTEOARTHRITIS TYPE: ICD-10-CM

## 2024-03-27 PROCEDURE — 73564 X-RAY EXAM KNEE 4 OR MORE: CPT | Mod: RT

## 2024-03-27 PROCEDURE — 99213 OFFICE O/P EST LOW 20 MIN: CPT | Performed by: ORTHOPAEDIC SURGERY

## 2024-03-27 PROCEDURE — 73564 X-RAY EXAM KNEE 4 OR MORE: CPT | Mod: RIGHT SIDE | Performed by: RADIOLOGY

## 2024-03-27 PROCEDURE — 1125F AMNT PAIN NOTED PAIN PRSNT: CPT | Performed by: ORTHOPAEDIC SURGERY

## 2024-03-27 PROCEDURE — 4004F PT TOBACCO SCREEN RCVD TLK: CPT | Performed by: ORTHOPAEDIC SURGERY

## 2024-03-27 PROCEDURE — 1159F MED LIST DOCD IN RCRD: CPT | Performed by: ORTHOPAEDIC SURGERY

## 2024-03-27 PROCEDURE — 99203 OFFICE O/P NEW LOW 30 MIN: CPT | Performed by: ORTHOPAEDIC SURGERY

## 2024-03-27 ASSESSMENT — PAIN - FUNCTIONAL ASSESSMENT: PAIN_FUNCTIONAL_ASSESSMENT: 0-10

## 2024-03-27 ASSESSMENT — PAIN SCALES - GENERAL: PAINLEVEL_OUTOF10: 1

## 2024-03-27 ASSESSMENT — PAIN DESCRIPTION - DESCRIPTORS: DESCRIPTORS: DULL

## 2024-03-27 NOTE — PROGRESS NOTES
HPI  This is a pleasant 73 y.o. male here today for right knee pain. The pt is very active and a former marathon runner. He enjoys playing [employer] ball, and noticed atraumatic R medial knee about 1-2 months prior after sport. His pain eventually improved and has been able to enjoy using an elliptical without pain. He now notices that uneven ground and high impact activity make the pain worse. Outside of activity modification, he has not received any other treatment.       Past Medical History:   Diagnosis Date    Personal history of other diseases of the nervous system and sense organs     History of benign essential tremor    Personal history of other specified conditions 02/23/2022    History of elevated prostate specific antigen (PSA)       Past Surgical History:   Procedure Laterality Date    OTHER SURGICAL HISTORY  01/19/2022    Cardioversion    OTHER SURGICAL HISTORY  11/07/2022    Transurethral resection of prostate    OTHER SURGICAL HISTORY  11/07/2022    Prostate biopsy       Social History     Tobacco Use    Smoking status: Some Days     Types: Cigars    Smokeless tobacco: Never       ROS  Reviewed and all pertinent positives mentioned in HPI.    EXAM  Patient is in no acute distress.  They are alert and oriented x3.  They are of normal mood and affect.  They are in no acute distress.  The patient's limb is warm and well-perfused.  They have intact sensation to light touch in all lower extremity dermatomes.  There is a minimal effusion.    The patient's quadriceps and hamstring strength is 5 of 5.    The patient can do a straight leg raise with no radicular pain.  negative lachmans. negative posterior drawer.  There is 2+ patellofemoral crepitus.   The knee is stable to varus and valgus stress at both 0 and 30°.  There is tenderness along the medial joint line.  positive McMurrays      IMAGING  Imaging reviewed today reveal no gross fracture or dislocation.  Degenerative changes noted in the medial  compartment.  MRI reviewed reveals No MRI for review      ASSESSMENT  right osteoarthritis    PLAN      The diagnosis, natural history, and treatment options were discussed at length. We decided to pursue conservative treatment options at this time including OTC antiinflammatories, and activity modification. We discussed that the patients next step options include prescriptions strength anti-inflammatories, CSI, and possibly visco supplementation injections and the definitve treatment would be a total knee replacement but the pt does not feel he is at that point. He mentioned that he is planning on making a trip to [facility] this September and mentioned he may be interested in an CSI before his trip should his symptoms fail to improve. All questions were answered and the pt agreeable with the plan.

## 2024-05-02 ENCOUNTER — EVALUATION (OUTPATIENT)
Dept: SPEECH THERAPY | Facility: HOSPITAL | Age: 74
End: 2024-05-02
Payer: MEDICARE

## 2024-05-02 ENCOUNTER — OFFICE VISIT (OUTPATIENT)
Dept: OTOLARYNGOLOGY | Facility: HOSPITAL | Age: 74
End: 2024-05-02
Payer: MEDICARE

## 2024-05-02 VITALS — WEIGHT: 183.5 LBS | HEIGHT: 70 IN | BODY MASS INDEX: 26.27 KG/M2 | TEMPERATURE: 97.3 F

## 2024-05-02 DIAGNOSIS — R09.89 CHRONIC THROAT CLEARING: ICD-10-CM

## 2024-05-02 DIAGNOSIS — J38.3 VOCAL CORD DYSFUNCTION: ICD-10-CM

## 2024-05-02 DIAGNOSIS — R09.A2 GLOBUS PHARYNGEUS: ICD-10-CM

## 2024-05-02 DIAGNOSIS — J38.3 VOCAL CORD DYSFUNCTION: Primary | ICD-10-CM

## 2024-05-02 DIAGNOSIS — J38.5 LARYNGOSPASM: ICD-10-CM

## 2024-05-02 DIAGNOSIS — J38.5 LARYNGOSPASM: Primary | ICD-10-CM

## 2024-05-02 PROCEDURE — 31575 DIAGNOSTIC LARYNGOSCOPY: CPT | Performed by: OTOLARYNGOLOGY

## 2024-05-02 PROCEDURE — 1160F RVW MEDS BY RX/DR IN RCRD: CPT | Performed by: OTOLARYNGOLOGY

## 2024-05-02 PROCEDURE — 99214 OFFICE O/P EST MOD 30 MIN: CPT | Mod: 25 | Performed by: OTOLARYNGOLOGY

## 2024-05-02 PROCEDURE — 99204 OFFICE O/P NEW MOD 45 MIN: CPT | Performed by: OTOLARYNGOLOGY

## 2024-05-02 PROCEDURE — 4004F PT TOBACCO SCREEN RCVD TLK: CPT | Performed by: OTOLARYNGOLOGY

## 2024-05-02 PROCEDURE — 1159F MED LIST DOCD IN RCRD: CPT | Performed by: OTOLARYNGOLOGY

## 2024-05-02 PROCEDURE — 92524 BEHAVRAL QUALIT ANALYS VOICE: CPT | Mod: GN

## 2024-05-02 RX ORDER — AZELASTINE 1 MG/ML
1 SPRAY, METERED NASAL 2 TIMES DAILY
COMMUNITY

## 2024-05-02 ASSESSMENT — PATIENT HEALTH QUESTIONNAIRE - PHQ9
SUM OF ALL RESPONSES TO PHQ9 QUESTIONS 1 & 2: 0
2. FEELING DOWN, DEPRESSED OR HOPELESS: NOT AT ALL
1. LITTLE INTEREST OR PLEASURE IN DOING THINGS: NOT AT ALL

## 2024-05-02 ASSESSMENT — PAIN - FUNCTIONAL ASSESSMENT: PAIN_FUNCTIONAL_ASSESSMENT: 0-10

## 2024-05-02 ASSESSMENT — PAIN SCALES - GENERAL: PAINLEVEL_OUTOF10: 0 - NO PAIN

## 2024-05-02 NOTE — LETTER
May 2, 2024     Ana Lilia Austin DO  [address]  [facility], [address]  [facility] [place] [address]    Patient: Tai Recinos   YOB: 1950   Date of Visit: 5/2/2024       Dear Dr. Ana Lilia Austin DO:    Thank you for referring Tai Recinos to me for evaluation. Below are my notes for this consultation.  If you have questions, please do not hesitate to call me. I look forward to following your patient along with you.       Sincerely,     Gladys Michel MD      CC: Shin Crowder MD  ______________________________________________________________________________________    Reason For Consult  Chief Complaint   Patient presents with   • Throat Clearing        HISTORY OF PRESENT ILLNESS:  This is a request for consultation from Dr Crowder for Tai Recinos, who is a 73 y.o. male presenting for an initial visit for frequent throat clearing.  The patient states this has been occurring for the past 3-4 years with unknown onset. His wife believes it may be a habit but he reports feeling something in a throat. His throat clearing is constant, all day long, it does not wake him up while sleeping.     The patient exercises frequently, he has not concerns with breathing. He believes exercise may improve his throat-clearing as he does not notice throat clearing during exercise.    The patient has had a cervical fusion C3, C4, C5, in 2008 with no noticeable changes to his throat.    The patient feels he uses speech an average amount, the patient reports no hearing concerns though he has not been tested for hearing loss.    The patient reports no heartburn or indigestion.     The patient states he has laryngospasms. The first laryngospasm was in 1989 when laughing while eating. He states he has had 10-15 throughout his life, about twice in the last three years. He states he can feel when a laryngospasm is about to occur like a cramp in his throat.       Swallow:  Is not impacted.  "  Breathing:  No issues.      Other factors:  throat clearing.  Triggered by:  eating/talking/walking/excercising. Throat clearing is more frequent during the day.     Past Medical History  He has a past medical history of Personal history of other diseases of the nervous system and sense organs and Personal history of other specified conditions (02/23/2022).  Surgical History  He has a past surgical history that includes Other surgical history (01/19/2022); Other surgical history (11/07/2022); and Other surgical history (11/07/2022).     Social History  He reports that he has been smoking cigars. He has never used smokeless tobacco. No history on file for alcohol use and drug use.    Allergies  Patient has no known allergies.    Review of Systems  All 10 systems were reviewed and negative except for above.      Physical Exam  CONSTITUTIONAL: Well developed, well nourished.    VOICE: increased loudness, normal  RESPIRATION: Inspiratory upper respiratory noise.  NEURO: Alert and oriented x3, cranial nerves II-XII intact and symmetric bilaterally.    EARS: Normal external ears, external auditory canals, normal hearing to conversational voice.    NOSE: External nose midline, anterior rhinoscopy is normal with limited visualization to the anterior aspect of the interior turbinates. No lesions noted.     ORAL CAVITY/OROPHARYNX/LIPS: Normal mucous membranes, normal floor of mouth/tongue/OP, no masses or lesions are noted.    SKIN: Left side neck scar from previous cervical fusion.  PSYCH: Alert and oriented with appropriate mood and affect.        Last Recorded Vitals  Temperature 36.3 °C (97.3 °F), height 1.778 m (5' 10\"), weight 83.2 kg (183 lb 8 oz).    Procedure  PROCEDURE NOTE:  Recommended flexible laryngoscopy/stroboscopy.  Risks, benefits,  and alternatives were explained.  They wish to proceed and provide verbal consent.     PROCEDURE:  Flexible Laryngoscopy, CPT 16912     POSTPROCEDURE DIAGNOSIS:    INDICATIONS: " Inability to tolerate mirror exam or abnormal findings on mirror, Flexible Laryngoscopy performed to assess one of the followin. Diagnosis of symptomatic disorder involving the voice, swallow, upper aerodigestive tract, including HENRY disorders, or  2. Preoperative evaluation of vocal cord function for individuals undergoing surgery where the RLN or vagus nerves are at risk of injury, or  3. Further evaluation of abnormalities of the upper aerodigestive tract discovered by another modality, such as CT, MRI, bronchoscopy or EGD    Description of Procedure:    After adequate afrin and lidocaine spray, I advanced the endoscope.  Visualization of the nasopharynx, vallecula, posterior pharyngeal walls, pyriform, epiglottis and post cricoid areas was unremarkable.  The following laryngeal findings were noted:    vocal cord movement was normal  closure was normal  Mucosal wave was not assessed  Compression was increased AP> FVC   Vocal cord edema in the posterior aspect  No interarytenoid thickening  Some vocal cord splinting during deep breathing  No lesions or masses  the subglottis was widely patent  Pharyngeal wall squeeze was normal    Procedure well tolerated.     A/P  This is an initial visit for frequent throat clearing with clinical findings notable for edema and some compensatory MTD.   Secondary issues identified and managed on this patient visit include: laryngospasms and mild vocal cord splinting.     Discussed interrelationship with laryngeal irritability, and habitual clearing and laryngospasm, perhaps even splinting.  ? Some mild hearing loss for vs habitual projection.    We discussed the treatment options to include, medical and surgical options.  We have decided to proceed as follows:   Discussed impact of speaking loudly and potential habitual component on cycle of throat clearing, vocal cord swelling, and throat clearing in response to swelling.  Set up an appointment with Anya for speech therapy  You need speech therapy as part of your treatment. We will help you to schedule your speech appointment after your visit or you can call Speech Therapy Scheduling at [phone]. Please follow up pending the outcome of speech therapy.    Scribe Attestation  By signing my name below, Keegan BRANDON Scribe   attest that this documentation has been prepared under the direction and in the presence of Gladys Michel MD.

## 2024-05-02 NOTE — PROGRESS NOTES
Reason For Consult  Chief Complaint   Patient presents with    Throat Clearing        HISTORY OF PRESENT ILLNESS:  This is a request for consultation from Dr Crowder for Tai Recinos, who is a 73 y.o. male presenting for an initial visit for frequent throat clearing.  The patient states this has been occurring for the past 3-4 years with unknown onset. His wife believes it may be a habit but he reports feeling something in a throat. His throat clearing is constant, all day long, it does not wake him up while sleeping.     The patient exercises frequently, he has not concerns with breathing. He believes exercise may improve his throat-clearing as he does not notice throat clearing during exercise.    The patient has had a cervical fusion C3, C4, C5, in 2008 with no noticeable changes to his throat.    The patient feels he uses speech an average amount, the patient reports no hearing concerns though he has not been tested for hearing loss.    The patient reports no heartburn or indigestion.     The patient states he has laryngospasms. The first laryngospasm was in 1989 when laughing while eating. He states he has had 10-15 throughout his life, about twice in the last three years. He states he can feel when a laryngospasm is about to occur like a cramp in his throat.       Swallow:  Is not impacted.   Breathing:  No issues.      Other factors:  throat clearing.  Triggered by:  eating/talking/walking/excercising. Throat clearing is more frequent during the day.     Past Medical History  He has a past medical history of Personal history of other diseases of the nervous system and sense organs and Personal history of other specified conditions (02/23/2022).  Surgical History  He has a past surgical history that includes Other surgical history (01/19/2022); Other surgical history (11/07/2022); and Other surgical history (11/07/2022).     Social History  He reports that he has been smoking cigars. He has never used  "smokeless tobacco. No history on file for alcohol use and drug use.    Allergies  Patient has no known allergies.    Review of Systems  All 10 systems were reviewed and negative except for above.      Physical Exam  CONSTITUTIONAL: Well developed, well nourished.    VOICE: increased loudness, normal  RESPIRATION: Inspiratory upper respiratory noise.  NEURO: Alert and oriented x3, cranial nerves II-XII intact and symmetric bilaterally.    EARS: Normal external ears, external auditory canals, normal hearing to conversational voice.    NOSE: External nose midline, anterior rhinoscopy is normal with limited visualization to the anterior aspect of the interior turbinates. No lesions noted.     ORAL CAVITY/OROPHARYNX/LIPS: Normal mucous membranes, normal floor of mouth/tongue/OP, no masses or lesions are noted.    SKIN: Left side neck scar from previous cervical fusion.  PSYCH: Alert and oriented with appropriate mood and affect.        Last Recorded Vitals  Temperature 36.3 °C (97.3 °F), height 1.778 m (5' 10\"), weight 83.2 kg (183 lb 8 oz).    Procedure  PROCEDURE NOTE:  Recommended flexible laryngoscopy/stroboscopy.  Risks, benefits,  and alternatives were explained.  They wish to proceed and provide verbal consent.     PROCEDURE:  Flexible Laryngoscopy, CPT 22670     POSTPROCEDURE DIAGNOSIS:    INDICATIONS: Inability to tolerate mirror exam or abnormal findings on mirror, Flexible Laryngoscopy performed to assess one of the followin. Diagnosis of symptomatic disorder involving the voice, swallow, upper aerodigestive tract, including HENRY disorders, or  2. Preoperative evaluation of vocal cord function for individuals undergoing surgery where the RLN or vagus nerves are at risk of injury, or  3. Further evaluation of abnormalities of the upper aerodigestive tract discovered by another modality, such as CT, MRI, bronchoscopy or EGD    Description of Procedure:    After adequate afrin and lidocaine spray, I advanced " the endoscope.  Visualization of the nasopharynx, vallecula, posterior pharyngeal walls, pyriform, epiglottis and post cricoid areas was unremarkable.  The following laryngeal findings were noted:    vocal cord movement was normal  closure was normal  Mucosal wave was not assessed  Compression was increased AP> FVC   Vocal cord edema in the posterior aspect  No interarytenoid thickening  Some vocal cord splinting during deep breathing  No lesions or masses  the subglottis was widely patent  Pharyngeal wall squeeze was normal    Procedure well tolerated.     A/P  This is an initial visit for frequent throat clearing with clinical findings notable for edema and some compensatory MTD.   Secondary issues identified and managed on this patient visit include: laryngospasms and mild vocal cord splinting.     Discussed interrelationship with laryngeal irritability, and habitual clearing and laryngospasm, perhaps even splinting.  ? Some mild hearing loss for vs habitual projection.    We discussed the treatment options to include, medical and surgical options.  We have decided to proceed as follows:   Discussed impact of speaking loudly and potential habitual component on cycle of throat clearing, vocal cord swelling, and throat clearing in response to swelling.  Set up an appointment with Anya for speech therapy You need speech therapy as part of your treatment. We will help you to schedule your speech appointment after your visit or you can call Speech Therapy Scheduling at [phone]. Please follow up pending the outcome of speech therapy.    Scribe Attestation  By signing my name below, IKeegan, Elizabeth   attest that this documentation has been prepared under the direction and in the presence of Gladys Michel MD.

## 2024-05-03 PROBLEM — R09.89 CHRONIC THROAT CLEARING: Status: ACTIVE | Noted: 2024-05-03

## 2024-05-03 PROBLEM — J38.3 VOCAL CORD DYSFUNCTION: Status: ACTIVE | Noted: 2024-05-03

## 2024-05-03 PROBLEM — J38.5 LARYNGOSPASM: Status: ACTIVE | Noted: 2024-05-03

## 2024-05-03 NOTE — PROGRESS NOTES
Speech-Language Pathology    Voice Evaluation    Patient Name: Tai Recinos  MRN: [id number]  Today's Date: 5/2/2024     Time Calculation  Start Time: 1540  Stop Time: 1610  Time Calculation (min): 30 min      Current Problem:  Patient Active Problem List   Diagnosis    Atrial fibrillation (Multi)    Cough    Elevated LDL cholesterol level    Elevated PSA    Essential tremor    GERD (gastroesophageal reflux disease)    Heavy breathing    Hyperglycemia    Lumbar radiculitis    Multilevel degenerative disc disease    Numbness in right leg    Osteoarthritis    Simple laceration of nose    Skin lesion    Vitamin D deficiency    Dysphonia    Chronic maxillary sinusitis    Seasonal allergic rhinitis    Chronic laryngitis    Medicare annual wellness visit, subsequent    Vocal cord dysfunction    Laryngospasm    Chronic throat clearing     SUBJECTIVE  Patient alert and oriented and ready to participate in office visit this date.    IMPRESSIONS  Patient presents with vocal cord dysfunction, laryngospasms and ILS 2/2 a diagnosis of chronic throat clearing. Patient appears to be an excellent candidate for therapy which will target vocal wellness, cough suppression therapy and respiratory retraining.    Voice quality based on the GRBAS scale: 0=absent; 1=mild; 2=moderate; 3=severe    stGstrstastdstest:st st1st Roughness: 0    Breathiness: 0    Asthenia: 0    Strain: 0    CONTRIBUTING FACTORS  VCD/ILS  Habitual behaviors that misuse/abuse the voice  Inadequate intake of fluids for hydration    HISTORY/REASON FOR REFERRAL  Tai Recinos is a 73 y.o. male referred [facility] Dr. [facility] by Dr. Crowder for laryngospasms and frequent throat clearing. The patient states this has been occurring for the past 3-4 years with unknown onset. His wife believes it may be a habit but he reports feeling something in a throat. His throat clearing is constant, all day long. It does not wake him up while sleeping. The patient  states he has a history of laryngospasms. The first laryngospasm was in 1989 when laughing while eating. He states he has had 10-15 throughout his life, about twice in the last three years. He states he can feel when a laryngospasm is about to occur, it feels like a cramp in his throat. He uses straw breathing to avoid a full event.     The patient exercises frequently, he has not concerns with breathing. He believes exercise may improve his throat clearing as he does not notice throat clearing during exercise.     The patient has had a cervical fusion C3, C4, C5, in 2008 with no noticeable changes to his throat.     The patient feels he uses speech an average amount. No hearing concerns though he has not been tested for hearing loss.     The patient reports no heartburn or indigestion.      ASSESSMENT  Pain Assessment:  Pain Assessment: 0-10  Pain Score: 0 - No pain    PERCEPTUAL VOICE FEATURES  Intonation: WFL  Loudness: increased  Nasal resonance: WFL    Additional vocal characteristics noted included:  Hard glottal attacks  Throat clears > coughing    FLEXIBLE LARYNGOSCOPY   Vocal fold mobility: normal  Glottic closure: complete  Secretions: no pooling  Supraglottic compression: increased AP > FVC  Vascularity: bilateral posterior vocal cord edema  Mucosal edge: smooth bilaterally  Interarytenoid edema: none  Subglottis: widely patent  Deep breaths: mild splinting  Pharyngeal contraction: normal    TREATMENT  Instructed patient this date in:  cough/throat clear reduction techniques (effortful swallow, RTB, Kelli, silent cough/clear)  seltzer or warm water gargle technique to safely remove mucus  vocal wellness strategies to reduce cough/throat clear triggers and phono trauma    Clinician modeled all techniques and accurate patient follow through confirmed.  Handout provided to facilitate optimal home carryover.    PLAN OF CARE  Frequency: 3 visits  Duration: 2 months    LONG TERM GOALS  Improve overall vocal  health to foster increased participation levels at home, work and in the community environment.    SHORT TERM GOALS  Patient will increase vocal wellness and decrease phono trauma in adherence with clinician prescribed vocal hygiene and wellness program per patient report 80% of his/her day.  Patient will demonstrate independent use of voice/breathing techniques x 80% accuracy.    RECOMMENDATIONS FOR THERAPEUTIC INTERVENTIONS  Respiratory training  Vocal hygiene program    POTENTIAL FOR IMPROVEMENT  Good    FACTORS AFFECTING PROGNOSIS  None    DISCUSSED PLAN OF CARE WITH patient and MD  DISCUSSED RISK/BENEFITS WITH patient  PATIENT/CAREGIVER AGREEABLE WITH PLAN.

## 2024-05-04 DIAGNOSIS — G25.0 ESSENTIAL TREMOR: ICD-10-CM

## 2024-05-06 RX ORDER — PRIMIDONE 50 MG/1
50 TABLET ORAL 2 TIMES DAILY
Qty: 180 TABLET | Refills: 1 | Status: SHIPPED | OUTPATIENT
Start: 2024-05-06

## 2024-05-21 ENCOUNTER — TREATMENT (OUTPATIENT)
Dept: SPEECH THERAPY | Facility: CLINIC | Age: 74
End: 2024-05-21
Payer: MEDICARE

## 2024-05-21 DIAGNOSIS — R09.89 CHRONIC THROAT CLEARING: ICD-10-CM

## 2024-05-21 DIAGNOSIS — J38.5 LARYNGOSPASM: ICD-10-CM

## 2024-05-21 DIAGNOSIS — J38.3 VOCAL CORD DYSFUNCTION: Primary | ICD-10-CM

## 2024-05-21 PROCEDURE — 92507 TX SP LANG VOICE COMM INDIV: CPT | Mod: GN

## 2024-05-21 ASSESSMENT — PAIN SCALES - GENERAL: PAINLEVEL_OUTOF10: 0 - NO PAIN

## 2024-05-21 ASSESSMENT — PAIN - FUNCTIONAL ASSESSMENT: PAIN_FUNCTIONAL_ASSESSMENT: 0-10

## 2024-05-21 NOTE — PROGRESS NOTES
Speech-Language Pathology    [facility]ology Treatment     Patient Name: Tai Recinos  MRN: [id number]  Today's Date: 5/21/2024     Time Calculation  Start Time: 1300  Stop Time: 1340  Time Calculation (min): 40 min      Current Problem:   1. Vocal cord dysfunction        2. Laryngospasm        3. Chronic throat clearing          Pain Assessment:  Pain Assessment: 0-10  Pain Score: 0 - No pain    SUBJECTIVE  Patient alert and oriented and ready to participate in office visit this date.    OBJECTIVE  LONG TERM GOAL  Improve overall vocal health to foster increased participation levels at home, work and in the community environment.    SHORT TERM GOALS  Patient will increase vocal wellness and decrease phono trauma in adherence with clinician prescribed vocal hygiene and wellness program per patient report 80% of his/her day.  Patient will demonstrate independent use of voice/breathing techniques x 80% accuracy.    ASSESSMENT  Patient reports working on the cough/throat clear suppression techniques introduced to him at his initial visit. He thinks he has reduced his throat clearing by ~ 50% by being more aware. He still feels like there is phlegm in his throat but he understands that to reduce the hypersensibility he needs to avoid clearing his throat.     This date reviewed cough/throat clear reduction techniques (effortful swallow, RTB, Kelli, silent cough/clear), seltzer or warm water gargle technique to safely remove mucus and vocal wellness strategies to reduce cough/throat clear triggers and phono trauma. Model provided and accurate follow through confirmed. Answered all questions/concerns.     Additionally, reviewed RTB (single and double sniff) to reset the vocal cords when feeling a laryngospasm may be coming. Reviewed laryngospasm triggers as well.     Recommend continued therapy in order to further reduce laryngeal hypersensitivity. Patient stated he would like to work on  the techniques and will call to schedule follow up as needed.       PLAN  Continue current plan of care  Monitor home program  Progress with POC, as tolerated  Discussed POC with   Patient/caregiver agreeable with POC

## 2024-08-01 ENCOUNTER — LAB (OUTPATIENT)
Dept: LAB | Facility: LAB | Age: 74
End: 2024-08-01
Payer: MEDICARE

## 2024-08-01 DIAGNOSIS — R97.20 ELEVATED PSA: ICD-10-CM

## 2024-08-01 LAB — PSA SERPL-MCNC: 4.16 NG/ML

## 2024-08-01 PROCEDURE — 84153 ASSAY OF PSA TOTAL: CPT

## 2024-08-01 PROCEDURE — 36415 COLL VENOUS BLD VENIPUNCTURE: CPT

## 2024-08-07 ENCOUNTER — APPOINTMENT (OUTPATIENT)
Dept: UROLOGY | Facility: CLINIC | Age: 74
End: 2024-08-07
Payer: MEDICARE

## 2024-08-07 DIAGNOSIS — N40.0 BPH WITH ELEVATED PSA: Primary | ICD-10-CM

## 2024-08-07 DIAGNOSIS — R97.20 BPH WITH ELEVATED PSA: Primary | ICD-10-CM

## 2024-08-07 PROCEDURE — 99212 OFFICE O/P EST SF 10 MIN: CPT | Performed by: STUDENT IN AN ORGANIZED HEALTH CARE EDUCATION/TRAINING PROGRAM

## 2024-08-07 PROCEDURE — G2211 COMPLEX E/M VISIT ADD ON: HCPCS | Performed by: STUDENT IN AN ORGANIZED HEALTH CARE EDUCATION/TRAINING PROGRAM

## 2024-08-07 NOTE — PROGRESS NOTES
Subjective   Patient ID: Tai Recinos is a 74 y.o. male.    Virtual or Telephone Consent    An interactive audio and video telecommunication system which permits real time communications between the patient (at the originating site) and provider (at the distant site) was utilized to provide this telehealth service.   Verbal consent was requested and obtained from Tai Recinos on this date, 08/07/24 for a telehealth visit.       HPI  73 yo male with history of TURP, and elevated PSA. No evidence of clinically significant prostate cancer, on MRI prostate from over a year ago. Now, PSA 4.1.       He is doing well overall. No complaints. He is active, no issues.     Lab Results   Component Value Date    PSA 4.16 (H) 08/01/2024    PSA 4.42 (H) 02/06/2024    PSA 4.82 (H) 10/18/2023    PSA 5.0 (H) 05/01/2023    PSA 4.5 (H) 10/17/2022       Review of Systems    Objective   Physical Exam    Assessment/Plan   73 yo male with history of TURP, and elevated PSA. No evidence of clinically significant prostate cancer, on MRI prostate from over a year ago. Now, PSA 4.1.       He is doing well overall. No complaints.     We will continue to monitor the trend every 6 months, he agrees with plan.      Plan:  PSA 6 months. Feb 2025.   FUV Feb 2025 after PSA obtained.     Diagnoses and all orders for this visit:  BPH with elevated PSA    Scribe Attestation  By signing my name below, ITala, Scribe attest that this documentation has been prepared under the direction and in the presence of Ellen Oconnell MD.

## 2024-11-02 DIAGNOSIS — G25.0 ESSENTIAL TREMOR: ICD-10-CM

## 2024-11-04 RX ORDER — PRIMIDONE 50 MG/1
50 TABLET ORAL 2 TIMES DAILY
Qty: 60 TABLET | Refills: 0 | Status: SHIPPED | OUTPATIENT
Start: 2024-11-04

## 2024-11-06 DIAGNOSIS — R73.9 HYPERGLYCEMIA: ICD-10-CM

## 2024-11-06 DIAGNOSIS — E55.9 VITAMIN D DEFICIENCY: ICD-10-CM

## 2024-11-06 DIAGNOSIS — E78.00 ELEVATED LDL CHOLESTEROL LEVEL: ICD-10-CM

## 2024-11-11 ENCOUNTER — LAB (OUTPATIENT)
Dept: LAB | Facility: LAB | Age: 74
End: 2024-11-11
Payer: MEDICARE

## 2024-11-11 DIAGNOSIS — E55.9 VITAMIN D DEFICIENCY: ICD-10-CM

## 2024-11-11 DIAGNOSIS — R73.9 HYPERGLYCEMIA: ICD-10-CM

## 2024-11-11 DIAGNOSIS — E78.00 ELEVATED LDL CHOLESTEROL LEVEL: ICD-10-CM

## 2024-11-11 LAB
25(OH)D3 SERPL-MCNC: 64 NG/ML (ref 30–100)
ALBUMIN SERPL BCP-MCNC: 4.1 G/DL (ref 3.4–5)
ALP SERPL-CCNC: 73 U/L (ref 33–136)
ALT SERPL W P-5'-P-CCNC: 10 U/L (ref 10–52)
ANION GAP SERPL CALC-SCNC: 8 MMOL/L (ref 10–20)
AST SERPL W P-5'-P-CCNC: 17 U/L (ref 9–39)
BILIRUB SERPL-MCNC: 0.7 MG/DL (ref 0–1.2)
BUN SERPL-MCNC: 19 MG/DL (ref 6–23)
CALCIUM SERPL-MCNC: 8.9 MG/DL (ref 8.6–10.3)
CHLORIDE SERPL-SCNC: 103 MMOL/L (ref 98–107)
CHOLEST SERPL-MCNC: 178 MG/DL (ref 0–199)
CHOLESTEROL/HDL RATIO: 4
CO2 SERPL-SCNC: 31 MMOL/L (ref 21–32)
CREAT SERPL-MCNC: 1.01 MG/DL (ref 0.5–1.3)
EGFRCR SERPLBLD CKD-EPI 2021: 79 ML/MIN/1.73M*2
GLUCOSE SERPL-MCNC: 114 MG/DL (ref 74–99)
HDLC SERPL-MCNC: 44.6 MG/DL
LDLC SERPL CALC-MCNC: 121 MG/DL
NON HDL CHOLESTEROL: 133 MG/DL (ref 0–149)
POTASSIUM SERPL-SCNC: 4.3 MMOL/L (ref 3.5–5.3)
PROT SERPL-MCNC: 6.6 G/DL (ref 6.4–8.2)
SODIUM SERPL-SCNC: 138 MMOL/L (ref 136–145)
TRIGL SERPL-MCNC: 60 MG/DL (ref 0–149)
VLDL: 12 MG/DL (ref 0–40)

## 2024-11-11 PROCEDURE — 80053 COMPREHEN METABOLIC PANEL: CPT

## 2024-11-11 PROCEDURE — 80061 LIPID PANEL: CPT

## 2024-11-11 PROCEDURE — 83036 HEMOGLOBIN GLYCOSYLATED A1C: CPT

## 2024-11-11 PROCEDURE — 82306 VITAMIN D 25 HYDROXY: CPT

## 2024-11-11 PROCEDURE — 36415 COLL VENOUS BLD VENIPUNCTURE: CPT

## 2024-11-12 LAB
EST. AVERAGE GLUCOSE BLD GHB EST-MCNC: 111 MG/DL
HBA1C MFR BLD: 5.5 %

## 2024-11-12 NOTE — RESULT ENCOUNTER NOTE
Cholesterol noted stability at 178, HDL 44, , triglycerides 60    Sugar slightly elevated 114 but otherwise liver, kidneys, electrolytes within normal limits    Vitamin D within normal limits    We are just waiting for the hemoglobin A1c at this time

## 2024-11-25 ENCOUNTER — APPOINTMENT (OUTPATIENT)
Dept: PRIMARY CARE | Facility: CLINIC | Age: 74
End: 2024-11-25
Payer: MEDICARE

## 2024-11-25 VITALS
BODY MASS INDEX: 27.35 KG/M2 | HEART RATE: 82 BPM | HEIGHT: 70 IN | WEIGHT: 191 LBS | SYSTOLIC BLOOD PRESSURE: 115 MMHG | DIASTOLIC BLOOD PRESSURE: 78 MMHG | OXYGEN SATURATION: 97 %

## 2024-11-25 DIAGNOSIS — E55.9 VITAMIN D DEFICIENCY: ICD-10-CM

## 2024-11-25 DIAGNOSIS — I48.91 ATRIAL FIBRILLATION, UNSPECIFIED TYPE (MULTI): ICD-10-CM

## 2024-11-25 DIAGNOSIS — E78.00 ELEVATED LDL CHOLESTEROL LEVEL: ICD-10-CM

## 2024-11-25 DIAGNOSIS — M25.522 LEFT ELBOW PAIN: ICD-10-CM

## 2024-11-25 DIAGNOSIS — G25.0 ESSENTIAL TREMOR: ICD-10-CM

## 2024-11-25 DIAGNOSIS — R97.20 ELEVATED PSA: ICD-10-CM

## 2024-11-25 DIAGNOSIS — Z00.00 ENCOUNTER FOR MEDICARE ANNUAL WELLNESS EXAM: Primary | ICD-10-CM

## 2024-11-25 DIAGNOSIS — Z23 NEED FOR PNEUMOCOCCAL 20-VALENT CONJUGATE VACCINATION: ICD-10-CM

## 2024-11-25 DIAGNOSIS — R73.9 HYPERGLYCEMIA: ICD-10-CM

## 2024-11-25 PROCEDURE — 90677 PCV20 VACCINE IM: CPT | Performed by: STUDENT IN AN ORGANIZED HEALTH CARE EDUCATION/TRAINING PROGRAM

## 2024-11-25 PROCEDURE — 4004F PT TOBACCO SCREEN RCVD TLK: CPT | Performed by: STUDENT IN AN ORGANIZED HEALTH CARE EDUCATION/TRAINING PROGRAM

## 2024-11-25 PROCEDURE — 1160F RVW MEDS BY RX/DR IN RCRD: CPT | Performed by: STUDENT IN AN ORGANIZED HEALTH CARE EDUCATION/TRAINING PROGRAM

## 2024-11-25 PROCEDURE — 3008F BODY MASS INDEX DOCD: CPT | Performed by: STUDENT IN AN ORGANIZED HEALTH CARE EDUCATION/TRAINING PROGRAM

## 2024-11-25 PROCEDURE — 1124F ACP DISCUSS-NO DSCNMKR DOCD: CPT | Performed by: STUDENT IN AN ORGANIZED HEALTH CARE EDUCATION/TRAINING PROGRAM

## 2024-11-25 PROCEDURE — 1170F FXNL STATUS ASSESSED: CPT | Performed by: STUDENT IN AN ORGANIZED HEALTH CARE EDUCATION/TRAINING PROGRAM

## 2024-11-25 PROCEDURE — 93000 ELECTROCARDIOGRAM COMPLETE: CPT | Performed by: STUDENT IN AN ORGANIZED HEALTH CARE EDUCATION/TRAINING PROGRAM

## 2024-11-25 PROCEDURE — G0009 ADMIN PNEUMOCOCCAL VACCINE: HCPCS | Performed by: STUDENT IN AN ORGANIZED HEALTH CARE EDUCATION/TRAINING PROGRAM

## 2024-11-25 PROCEDURE — G0439 PPPS, SUBSEQ VISIT: HCPCS | Performed by: STUDENT IN AN ORGANIZED HEALTH CARE EDUCATION/TRAINING PROGRAM

## 2024-11-25 PROCEDURE — 99214 OFFICE O/P EST MOD 30 MIN: CPT | Performed by: STUDENT IN AN ORGANIZED HEALTH CARE EDUCATION/TRAINING PROGRAM

## 2024-11-25 PROCEDURE — 1159F MED LIST DOCD IN RCRD: CPT | Performed by: STUDENT IN AN ORGANIZED HEALTH CARE EDUCATION/TRAINING PROGRAM

## 2024-11-25 PROCEDURE — G0446 INTENS BEHAVE THER CARDIO DX: HCPCS | Performed by: STUDENT IN AN ORGANIZED HEALTH CARE EDUCATION/TRAINING PROGRAM

## 2024-11-25 RX ORDER — PRIMIDONE 50 MG/1
50 TABLET ORAL 2 TIMES DAILY
Qty: 180 TABLET | Refills: 3 | Status: SHIPPED | OUTPATIENT
Start: 2024-11-25

## 2024-11-25 ASSESSMENT — ACTIVITIES OF DAILY LIVING (ADL)
GROCERY_SHOPPING: INDEPENDENT
TAKING_MEDICATION: INDEPENDENT
MANAGING_FINANCES: INDEPENDENT
BATHING: INDEPENDENT
DRESSING: INDEPENDENT
DOING_HOUSEWORK: INDEPENDENT

## 2024-11-25 ASSESSMENT — ENCOUNTER SYMPTOMS
OCCASIONAL FEELINGS OF UNSTEADINESS: 0
LOSS OF SENSATION IN FEET: 0
DEPRESSION: 0

## 2024-11-25 NOTE — PROGRESS NOTES
Subjective   Reason for Visit: Tai Recinos is an 74 y.o. male here for a Medicare Wellness visit.     Past Medical, Surgical, and Family History reviewed and updated in chart.    Reviewed all medications by prescribing practitioner or clinical pharmacist (such as prescriptions, OTCs, herbal therapies and supplements) and documented in the medical record.    HPI  Medicare wellness (subsequent)  Immunizations: Pfizer COVID-19 vaccines with booster  Shingles vaccine up-to-date x 2  Influenza vaccine 2024  Willing to update PCV 20 at today's visit  Colonoscopy: done in 2023; 5 yr clearance. DUE 2027  Tobacco: Former Smoker, quit Cigarettes >30 years ago. Occasional Cigar.  Alcohol: Occasional  Obtained blood work on 11/11/24 in preparation for today's visit    2. Essential Tremor  He takes primidone 50 mg daily for his tremors in his right arm.   The tremors are worse in this 1st and 3rd digit when flexed, or with fine motor movements such as writing.   Reports the primidone does not seem to be helping as much anymore  Of note he does mention his tremor seems to improve with alcohol  He did follow with Neurology in [place], however has not seen a neurologist in a while.  Requesting referral to neurology for medication and tremor management     3. Hx of Afib.  Status post cardio ablation in 2013 for A. Flutter/A Fib.   He did follow with a cardiologist in [place].   He was recommended by his cardiologist to establish with a Cardiologist at [facility].  He denies chest pain, shortness of breath, headaches, or leg edema.  Interested in establishing with local cardiologist  Currently does not take any Eliquis or any anticoagulants     4. Post nasal drip/ chronic throat clearing  Previously was evaluated by Dr. Gilbert, however, is now following Dr. Michel for ENT  Was informed his chronic throat clearing is due to habit rather than structural  He has seen a speech pathologist for this     5. Vit D  def  Documented history of low vit D  Lab work performed on 11/11/24 showed vitamin D within normal limits    6. Hyperlipidemia  No longer taking rivaroxaban 20 mg  Lab work from 11/11/24 Cholesterol noted stability at 178, HDL 44, , triglycerides 60    7. Elevated PSA  Elevated PSA of 4.16 on 8/1/24, decreased from 4.42 on 2/6/24  Follows with urology, Dr. Oconnell    8. Former smoker (V15.82) (Z87.891)/ Cigar smoker (305.1) (F17.290)  If AAA screening not performed or found in your records when they arrive, we will provided a requisition at your next visit to have this performed.    9. Hyperglycemia   Sugar slightly elevated 114 but otherwise liver, kidneys, electrolytes within normal limits  HbA1c was 5.5% on 11/11/24    10. Left Medial Epicondylitis  2 weeks of pain in the medial epicondyle  Notes bony prominence of medial epicondyle  Reports pain with lifting weights  Denies injuries, numbness, tingling    11. Right ear itching  Noted some minor symptoms but does not fully believe that this is worrisome      No Known Allergies    Immunization History   Administered Date(s) Administered    Flu vaccine, quadrivalent, high-dose, preservative free, age 65y+ (FLUZONE) 08/22/2022    Flu vaccine, trivalent, preservative free, HIGH-DOSE, age 65y+ (Fluzone) 08/22/2022    Hepatitis A vaccine, age 19 years and greater (HAVRIX) 07/27/2022, 02/10/2023    Influenza, Seasonal, Quadrivalent, Adjuvanted 10/15/2023    Influenza, trivalent, adjuvanted 10/29/2024    Moderna SARS-CoV-2 Vaccination 07/23/2022    Pfizer COVID-19 vaccine, 12 years and older, (30mcg/0.3mL) (Comirnaty) 10/19/2023, 10/29/2024    Pfizer Purple Cap SARS-CoV-2 01/29/2021, 02/19/2021    Pneumococcal conjugate vaccine, 20-valent (PREVNAR 20) 11/25/2024    Pneumococcal polysaccharide vaccine, 23-valent, age 2 years and older (PNEUMOVAX 23) 12/17/2020    RESPIRATORY SYNCYTIAL VIRUS (RSV), ELIGIBLE PREGNANT PTS, 0.5 ML (ABRYSVO) 10/19/2023    Tdap  "vaccine, age 7 year and older (BOOSTRIX, ADACEL) 06/08/2022    Typhoid, ViCPs 07/27/2022       Patient Self Assessment of Health Status  Patient Self Assessment: Excellent    Nutrition and Exercise  Current Diet: Well Balanced Diet  Adequate Fluid Intake: Yes  Caffeine: Yes  Exercise Frequency: Regularly    Functional Ability/Level of Safety  Cognitive Impairment Observed: No cognitive impairment observed    Home Safety Risk Factors: None    Patient Care Team:  Ante T Pletikosic, DO as PCP - General  Ante T P[place]ikosic, DO [place] PCP [place] [facility] [place]  Shin Crowder MD as Surgeon (Otolaryngology)     Review of Systems  All pertinent positive symptoms are included in the history of present illness.    All other systems have been reviewed and are negative and noncontributory to this patient's current ailments.    Objective   Vitals:  /78   Pulse 82   Ht 1.778 m (5' 10\")   Wt 86.6 kg (191 lb)   SpO2 97%   BMI 27.41 kg/m²     Lab on 11/11/2024   Component Date Value Ref Range Status    Cholesterol 11/11/2024 178  0 - 199 mg/dL Final          Age      Desirable   Borderline High   High     0-19 Y     0 - 169       170 - 199     >/= 200    20-24 Y     0 - 189       190 - 224     >/= 225         >24 Y     0 - 199       200 - 239     >/= 240   **All ranges are based on fasting samples. Specific   therapeutic targets will vary based on patient-specific   cardiac risk.    Pediatric guidelines reference:Pediatrics 2011, 128(S5).Adult guidelines reference: NCEP ATPIII Guidelines,VIPIN 2001, 258:2486-97    Venipuncture immediately after or during the administration of Metamizole may lead to falsely low results. Testing should be performed immediately prior to Metamizole dosing.    HDL-Cholesterol 11/11/2024 44.6  mg/dL Final      Age       Very Low   Low     Normal    High    0-19 Y    < 35      < 40     40-45     ----  20-24 Y    ----     < 40      >45      ----        >24 Y      ----     < 40     40-60      " >60      Cholesterol/HDL Ratio 11/11/2024 4.0   Final      Ref Values  Desirable  < 3.4  High Risk  > 5.0    LDL Calculated 11/11/2024 121 (H)  <=99 mg/dL Final                                Near   Borderline      AGE      Desirable  Optimal    High     High     Very High     0-19 Y     0 - 109     ---    110-129   >/= 130     ----    20-24 Y     0 - 119     ---    120-159   >/= 160     ----      >24 Y     0 -  99   100-129  130-159   160-189     >/=190      VLDL 11/11/2024 12  0 - 40 mg/dL Final    Triglycerides 11/11/2024 60  0 - 149 mg/dL Final    Age              Desirable        Borderline         High        Very High  SEX:B           mg/dL             mg/dL               mg/dL      mg/dL  <=14D                       ----               ----        ----  15D-365D                    ----               ----        ----  1Y-9Y           0-74               75-99             >=100       ----  10Y-19Y        0-89                            >=130       ----  20Y-24Y        0-114             115-149             >=150      ----  >= 25Y         0-149             150-199             200-499    >=500      Venipuncture immediately after or during the administration of Metamizole may lead to falsely low results. Testing should be performed immediately prior to Metamizole dosing.    Non HDL Cholesterol 11/11/2024 133  0 - 149 mg/dL Final          Age       Desirable   Borderline High   High     Very High     0-19 Y     0 - 119       120 - 144     >/= 145    >/= 160    20-24 Y     0 - 149       150 - 189     >/= 190      ----         >24 Y    30 mg/dL above LDL Cholesterol goal      Glucose 11/11/2024 114 (H)  74 - 99 mg/dL Final    Sodium 11/11/2024 138  136 - 145 mmol/L Final    Potassium 11/11/2024 4.3  3.5 - 5.3 mmol/L Final    Chloride 11/11/2024 103  98 - 107 mmol/L Final    Bicarbonate 11/11/2024 31  21 - 32 mmol/L Final    Anion Gap 11/11/2024 8 (L)  10 - 20 mmol/L Final    Urea Nitrogen 11/11/2024  19  6 - 23 mg/dL Final    Creatinine 11/11/2024 1.01  0.50 - 1.30 mg/dL Final    eGFR 11/11/2024 79  >60 mL/min/1.73m*2 Final    Calculations of estimated GFR are performed using the 2021 CKD-EPI Study Refit equation without the race variable for the IDMS-Traceable creatinine methods.  https://jasn.asnjournals.org/content/early/2021/09/22/ASN.7799205774    Calcium 11/11/2024 8.9  8.6 - 10.3 mg/dL Final    Albumin 11/11/2024 4.1  3.4 - 5.0 g/dL Final    Alkaline Phosphatase 11/11/2024 73  33 - 136 U/L Final    Total Protein 11/11/2024 6.6  6.4 - 8.2 g/dL Final    AST 11/11/2024 17  9 - 39 U/L Final    Bilirubin, Total 11/11/2024 0.7  0.0 - 1.2 mg/dL Final    ALT 11/11/2024 10  10 - 52 U/L Final    Patients treated with Sulfasalazine may generate falsely decreased results for ALT.    Hemoglobin A1C 11/11/2024 5.5  See comment % Final    Estimated Average Glucose 11/11/2024 111  Not Established mg/dL Final    Vitamin D, 25-Hydroxy, Total 11/11/2024 64  30 - 100 ng/mL Final   Lab on 08/01/2024   Component Date Value Ref Range Status    Prostate Specific AG 08/01/2024 4.16 (H)  <=4.00 ng/mL Final       Physical Exam  CONSTITUTIONAL - well nourished, well developed, looks like stated age, in no acute distress, not ill-appearing, and not tired appearing  SKIN - normal skin color and pigmentation, normal skin turgor without rash, lesions, or nodules visualized  HEAD - no trauma, normocephalic  EYES - normal external exam  ENT - TM's intact, no injection, mild cerumen observed in left, no signs of infection  NECK - supple without rigidity, no neck mass was observed  CHEST - clear to auscultation, no wheezing, no crackles and no rales, good effort  CARDIAC - regular rate and regular rhythm, no skipped beats, 2/6 systolic murmur in right 2 ICS  MSK - mildly tender to palpation on left medial epicondyle, no edema or erythema, mild bony prominence of medial epicondyle, pain with resisted wrist flexion, bilateral 5/5 strength  with wrist flexion, extension, elbow flexion and extension  EXTREMITIES - no edema, no deformities  NEUROLOGICAL - normal gait, normal balance, normal motor, no ataxia  PSYCHIATRIC - alert, pleasant and cordial, age-appropriate      Assessment/Plan   Problem List Items Addressed This Visit       Atrial fibrillation (Multi)    Relevant Orders    Referral to Cardiology    CT cardiac scoring wo IV contrast    Elevated LDL cholesterol level    Relevant Orders    CT cardiac scoring wo IV contrast    Elevated PSA    Essential tremor    Relevant Medications    primidone (Mysoline) 50 mg tablet    Other Relevant Orders    Referral to Neurology    Hyperglycemia    Vitamin D deficiency     Other Visit Diagnoses       Encounter for Medicare annual wellness exam    -  Primary    Relevant Orders    CT cardiac scoring wo IV contrast    Left elbow pain        Relevant Orders    XR elbow left 3+ views    Need for pneumococcal 20-valent conjugate vaccination        Relevant Orders    Pneumococcal conjugate vaccine, 20-valent (PREVNAR 20) (Completed)            1. Encounter for Medicare annual wellness exam  Complete history and physical examination as well as an MCR were performed  EKG showed normal sinus bradycardia without any acute changes  We will notify of test results once available and make treatment recommendations accordingly  PCV 20 was provided today    2. Essential tremor   Referral sent to neurologist for further management of primidone  Otherwise, refill sent to your local pharmacy    3. Atrial fibrillation   Asymptomatic, Please call and schedule an appointment with cardiology at your earliest mutual convenience  Provided information for Dr. Gaviria  Per your request, ordered CT cardiac score, please complete at your earliest mutual convenience  The 10-year ASCVD risk score (Saadia ATWOOD, et al., 2019) is: 20%    Values used to calculate the score:      Age: 74 years      Sex: Male      Is Non- :  No      Diabetic: No      Tobacco smoker: Yes      Systolic Blood Pressure: 115 mmHg      Is BP treated: No      HDL Cholesterol: 44.6 mg/dL      Total Cholesterol: 178 mg/dL  Cardiovascular risk discussed and, if needed, lifestyle modifications recommended, including nutritional choices, exercise, and elimination of habits contributing to risk.    We agreed on a plan to reduce the current cardiovascular risk  Over 15 minutes was discussed on this topic    4. Post nasal drip/chronic throat clearing   Continue to follow with Dr. Michel as indicated     5. Vitamin D deficiency  Stable, no acute changes  We will continue to monitor    6. Hyperlipidemia  Stable, no acute changes  We will continue to monitor    7. Prostate cancer screening   Continue to follow with Dr. Oconnell     8. Former smoker / Cigar smoker   If AAA screening not performed  Will discuss in the near future    9.  Hyperglycemia   Stable, no acute changes  HbA1c 5.5% on 11/11/24    10. Left Medial Epicondylitis  Would like you to get xray of left elbow  Use elbow brace and ensure that it is placed distal to elbow joint for proper support  Can also use 5 lb weight to do wrist curls and wrist extensions, however, ensure that when you are doing these exercises you are using the brace to prevent further damage  Please let us know if this is not improving and can consider physical therapy or corticosteroid elbow injections    11. Right ear Itching  Please continue monitoring signs/symptoms      Thank you for letting us be a part of your care team.  Please call the office if you have further questions or concerns regarding your care  Please follow up in 1 year for your annual wellness exam.

## 2024-12-16 PROBLEM — I48.0 PAF (PAROXYSMAL ATRIAL FIBRILLATION) (MULTI): Status: ACTIVE | Noted: 2024-12-16

## 2024-12-17 ENCOUNTER — OFFICE VISIT (OUTPATIENT)
Dept: CARDIOLOGY | Facility: CLINIC | Age: 74
End: 2024-12-17
Payer: MEDICARE

## 2024-12-17 VITALS
DIASTOLIC BLOOD PRESSURE: 67 MMHG | HEART RATE: 57 BPM | SYSTOLIC BLOOD PRESSURE: 135 MMHG | BODY MASS INDEX: 27.55 KG/M2 | WEIGHT: 192 LBS

## 2024-12-17 DIAGNOSIS — I48.0 PAF (PAROXYSMAL ATRIAL FIBRILLATION) (MULTI): Primary | ICD-10-CM

## 2024-12-17 DIAGNOSIS — I48.91 ATRIAL FIBRILLATION, UNSPECIFIED TYPE (MULTI): ICD-10-CM

## 2024-12-17 PROCEDURE — 1159F MED LIST DOCD IN RCRD: CPT | Performed by: INTERNAL MEDICINE

## 2024-12-17 PROCEDURE — 99204 OFFICE O/P NEW MOD 45 MIN: CPT | Performed by: INTERNAL MEDICINE

## 2024-12-17 PROCEDURE — 4004F PT TOBACCO SCREEN RCVD TLK: CPT | Performed by: INTERNAL MEDICINE

## 2024-12-17 PROCEDURE — 99214 OFFICE O/P EST MOD 30 MIN: CPT | Performed by: INTERNAL MEDICINE

## 2024-12-17 PROCEDURE — 1160F RVW MEDS BY RX/DR IN RCRD: CPT | Performed by: INTERNAL MEDICINE

## 2024-12-17 NOTE — PROGRESS NOTES
Chief Complaint:   Atrial Fibrillation     History of Present Illness     Tai Recinos is a 74 y.o. male presenting with paroxysmal atrial fibrillation and flutter s/p RFA atrial flutter 2013 and PVI 2021.  The patient initially presented with no symptoms.  The patient has been maintaining sinus rhythm on medical treatment which they are tolerating well and are compliant.  The current treatment strategy is rhythm control.  The CHADS2-VASC2 score is  1 and the ACC/AHA guidelines recommend no anticoagulation for stroke prophylaxis.  Exercising.  No CP/MOULTON.  Here to establish care.    Review of Systems  All pertinent systems have been reviewed and are negative except for what is stated in the history of present illness.    All other systems have been reviewed and are negative and noncontributory to this patient's current ailments.  .       Previous History     Past Medical History:  He has a past medical history of Personal history of other diseases of the nervous system and sense organs and Personal history of other specified conditions (02/23/2022).    Past Surgical History:  He has a past surgical history that includes Other surgical history (01/19/2022); Other surgical history (11/07/2022); and Other surgical history (11/07/2022).      Social History:  He reports that he has been smoking cigars. He has never used smokeless tobacco. No history on file for alcohol use and drug use.    Family History:  Family History   Problem Relation Name Age of Onset    Hearing loss Other      Cancer Other          Allergies:  Patient has no known allergies.    Outpatient Medications:  Current Outpatient Medications   Medication Instructions    primidone (MYSOLINE) 50 mg, oral, 2 times daily       Physical Examination   Vitals:  Visit Vitals  /67 (BP Location: Right arm, Patient Position: Sitting, BP Cuff Size: Adult)   Pulse 57   Wt 87.1 kg (192 lb)   BMI 27.55 kg/m²   Smoking Status Some Days   BSA 2.07 m²    Physical  Exam  Vitals reviewed.   Constitutional:       General: He is not in acute distress.     Appearance: Normal appearance.   HENT:      Head: Normocephalic and atraumatic.      Nose: Nose normal.   Eyes:      Conjunctiva/sclera: Conjunctivae normal.   Cardiovascular:      Rate and Rhythm: Normal rate and regular rhythm.      Pulses: Normal pulses.      Heart sounds: No murmur heard.  Pulmonary:      Effort: Pulmonary effort is normal. No respiratory distress.      Breath sounds: Normal breath sounds. No wheezing, rhonchi or rales.   Abdominal:      General: Bowel sounds are normal. There is no distension.      Palpations: Abdomen is soft.      Tenderness: There is no abdominal tenderness.   Musculoskeletal:         General: No swelling.      Right lower leg: No edema.      Left lower leg: No edema.   Skin:     General: Skin is warm and dry.      Capillary Refill: Capillary refill takes less than 2 seconds.   Neurological:      General: No focal deficit present.      Mental Status: He is alert.   Psychiatric:         Mood and Affect: Mood normal.             Labs/Imaging/Cardiac Studies   I have personally reviewed the patient's available lab work, primary care appointment notes, pertinent imaging studies, and cardiac studies and have discussed them and my independent interpretation of those results with the patient and caregiver at this appointment.  All pertinent recent Emergency Department evaluations and Hospital admissions were also reviewed in detail with the patient and caregiver.    Reviewed 2021 records.    Echo:  No echocardiogram results found for the past 12 months       Assessment and Recommendations     Assessment/Plan     1. PAF (paroxysmal atrial fibrillation) (Multi) (Primary)  The patient has been clinically stable, asymptomatic, and maintaining normal sinus rhythm s/p RFA of flutter and PVI for AF.  No meds needed.     Tai Recinos will return in 1 year for an office visit.       Emil Gavriia,  MD    Exclusive of any other services or procedures performed, IEmil MD , spent 30 minutes in duration for this visit today.  This time consisted of chart review, obtaining history, and/or performing the exam as documented above as well as documenting the clinical information for the encounter in the electronic record, discussing treatment options, plans, and/or goals with patient, family, and/or caregiver, refilling medications, updating the electronic record, ordering medicines, lab work, imaging, referrals, and/or procedures as documented above and communicating with other [facility] professionals. I have discussed the results of laboratory, radiology, and cardiology studies with the patient and their family/caregiver.

## 2024-12-18 ENCOUNTER — TELEPHONE (OUTPATIENT)
Dept: CARDIOLOGY | Facility: CLINIC | Age: 74
End: 2024-12-18
Payer: MEDICARE

## 2024-12-18 NOTE — TELEPHONE ENCOUNTER
Letter must say why Mr Recinos had been given a Rx for Xarelto and Flecanide and what the outcome has been, and that he is no longer on these meds and has not had any othr instances of cardiac issues in the past two years.

## 2024-12-30 ENCOUNTER — TELEPHONE (OUTPATIENT)
Dept: CARDIOLOGY | Facility: CLINIC | Age: 74
End: 2024-12-30
Payer: MEDICARE

## 2024-12-30 NOTE — TELEPHONE ENCOUNTER
Letter was scanned into [employer] so patient could print it.   Confirmed with patient that he could see it.

## 2025-02-05 LAB — PSA SERPL-MCNC: 3.27 NG/ML

## 2025-02-11 NOTE — PROGRESS NOTES
Subjective   Patient ID: Tai Recinos is a 74 y.o. male.    Virtual or Telephone Consent    An interactive audio and video telecommunication system which permits real time communications between the patient (at the originating site) and provider (at the distant site) was utilized to provide this telehealth service.   Verbal consent was requested and obtained from Tai Recinos on this date, 02/12/25 for a telehealth visit.     HPI  74 y.o. male presents for a virtual follow up regarding an elevated PSA and history of TURP. Last MRI prostate, indicated no evidence of clinically significant prostate cancer. Current PSA is 3.27.    He is not currently prescribed any medication for urinary symptoms. He is content with his urinary symptoms.     He states that he is drinking more water and reduced the amount of sugar.       Lab Results   Component Value Date    PSA 3.27 02/04/2025    PSA 4.16 (H) 08/01/2024    PSA 4.42 (H) 02/06/2024    PSA 4.82 (H) 10/18/2023    PSA 5.0 (H) 05/01/2023         Review of Systems  A complete review of systems was performed. All systems are noted to be negative unless indicated in the history of present illness, impression, active problem list, or past histories.     Objective   Physical Exam    Assessment/Plan   Diagnoses and all orders for this visit:  Encounter for screening prostate specific antigen (PSA) measurement      74 y.o. male presents for a virtual follow up regarding an elevated PSA and history of TURP. Last MRI prostate, indicated no evidence of clinically significant prostate cancer. Current PSA is 3.27.    I personally reviewed the PSA level that has significantly decreased to a level of 3.27. It is trending downwards from 5, 2 years ago. As the patient's PSA level is trending downwards, I am not concerned about the possibility of prostate cancer.     The patient will continue to have a PSA level completed every year. After a few more years, he is able to  discontinue the testing of the PSA level. He has PSA level ordered by his PCP every fall.     I have reviewed with the patient that he is able to continue to follow up with the PCP and have his PSA ordered by them. He no longer requires my care as his elevated PSA level has decreased. He can follow up on an as needed basis. He was encouraged to contact my office if the PSA level testing is elevated to a significant degree.     Plan:  Follow up with PCP regarding PSA level testing   FUV as needed         Scribe Attestation   By signing my name below, I, Villa Thomas, Scribe attestation that this documentation has been prepared under the direction and in the presence of Ellen Oconnell MD.

## 2025-02-12 ENCOUNTER — APPOINTMENT (OUTPATIENT)
Dept: UROLOGY | Facility: CLINIC | Age: 75
End: 2025-02-12
Payer: MEDICARE

## 2025-02-12 DIAGNOSIS — Z12.5 ENCOUNTER FOR SCREENING PROSTATE SPECIFIC ANTIGEN (PSA) MEASUREMENT: Primary | ICD-10-CM

## 2025-02-12 PROCEDURE — 99212 OFFICE O/P EST SF 10 MIN: CPT | Performed by: STUDENT IN AN ORGANIZED HEALTH CARE EDUCATION/TRAINING PROGRAM

## 2025-03-26 ENCOUNTER — HOSPITAL ENCOUNTER (OUTPATIENT)
Dept: RADIOLOGY | Facility: CLINIC | Age: 75
Discharge: HOME | End: 2025-03-26
Payer: MEDICARE

## 2025-03-26 DIAGNOSIS — I48.91 ATRIAL FIBRILLATION, UNSPECIFIED TYPE (MULTI): ICD-10-CM

## 2025-03-26 DIAGNOSIS — E78.00 ELEVATED LDL CHOLESTEROL LEVEL: ICD-10-CM

## 2025-03-26 DIAGNOSIS — Z00.00 ENCOUNTER FOR MEDICARE ANNUAL WELLNESS EXAM: ICD-10-CM

## 2025-03-26 PROCEDURE — 75571 CT HRT W/O DYE W/CA TEST: CPT

## 2025-03-26 NOTE — RESULT ENCOUNTER NOTE
CT cardiac score did show a value of 253  I would recommend that he continue following up with his cardiologist per their guidelines    There is also borderline dilatated ascending aortic aneurysm at 4.0 cm    Again, please follow-up with cardiology

## 2025-03-27 ENCOUNTER — PATIENT MESSAGE (OUTPATIENT)
Dept: CARDIOLOGY | Facility: CLINIC | Age: 75
End: 2025-03-27
Payer: MEDICARE

## 2025-03-27 DIAGNOSIS — E78.00 ELEVATED LDL CHOLESTEROL LEVEL: Primary | ICD-10-CM

## 2025-03-27 RX ORDER — ROSUVASTATIN CALCIUM 10 MG/1
10 TABLET, COATED ORAL DAILY
Qty: 90 TABLET | Refills: 3 | Status: SHIPPED | OUTPATIENT
Start: 2025-03-27 | End: 2026-03-27

## 2025-05-08 DIAGNOSIS — E78.00 ELEVATED LDL CHOLESTEROL LEVEL: ICD-10-CM

## 2025-05-09 LAB
ALBUMIN SERPL-MCNC: 4.3 G/DL (ref 3.6–5.1)
ALBUMIN/GLOB SERPL: 1.8 (CALC) (ref 1–2.5)
ALP SERPL-CCNC: 67 U/L (ref 35–144)
ALT SERPL-CCNC: 11 U/L (ref 9–46)
AST SERPL-CCNC: 18 U/L (ref 10–35)
BILIRUB DIRECT SERPL-MCNC: 0.2 MG/DL
BILIRUB INDIRECT SERPL-MCNC: 0.7 MG/DL (CALC) (ref 0.2–1.2)
BILIRUB SERPL-MCNC: 0.9 MG/DL (ref 0.2–1.2)
CHOLEST SERPL-MCNC: 121 MG/DL
CHOLEST/HDLC SERPL: 2.3 (CALC)
GLOBULIN SER CALC-MCNC: 2.4 G/DL (CALC) (ref 1.9–3.7)
HDLC SERPL-MCNC: 52 MG/DL
LDLC SERPL CALC-MCNC: 55 MG/DL (CALC)
NONHDLC SERPL-MCNC: 69 MG/DL (CALC)
PROT SERPL-MCNC: 6.7 G/DL (ref 6.1–8.1)
TRIGL SERPL-MCNC: 61 MG/DL

## 2025-08-28 ENCOUNTER — APPOINTMENT (OUTPATIENT)
Dept: PRIMARY CARE | Facility: CLINIC | Age: 75
End: 2025-08-28
Payer: MEDICARE

## 2025-08-28 VITALS
WEIGHT: 187 LBS | HEART RATE: 70 BPM | HEIGHT: 70 IN | BODY MASS INDEX: 26.77 KG/M2 | SYSTOLIC BLOOD PRESSURE: 136 MMHG | DIASTOLIC BLOOD PRESSURE: 80 MMHG

## 2025-08-28 DIAGNOSIS — J06.9 ACUTE URI: Primary | ICD-10-CM

## 2025-08-28 DIAGNOSIS — R09.82 POST-NASAL DRIP: ICD-10-CM

## 2025-08-28 DIAGNOSIS — M26.621 RIGHT-SIDED TEMPOROMANDIBULAR JOINT PAIN-DYSFUNCTION SYNDROME: ICD-10-CM

## 2025-08-28 RX ORDER — AZELASTINE 1 MG/ML
2 SPRAY, METERED NASAL 2 TIMES DAILY
Qty: 30 ML | Refills: 1 | Status: SHIPPED | OUTPATIENT
Start: 2025-08-28 | End: 2026-08-28

## 2025-08-28 RX ORDER — METHYLPREDNISOLONE 4 MG/1
TABLET ORAL
Qty: 21 TABLET | Refills: 0 | Status: SHIPPED | OUTPATIENT
Start: 2025-08-28

## 2025-08-28 RX ORDER — FLUTICASONE PROPIONATE 50 MCG
1 SPRAY, SUSPENSION (ML) NASAL DAILY
Qty: 16 G | Refills: 5 | Status: SHIPPED | OUTPATIENT
Start: 2025-08-28 | End: 2026-08-28
